# Patient Record
Sex: MALE | Race: WHITE | NOT HISPANIC OR LATINO | Employment: FULL TIME | URBAN - METROPOLITAN AREA
[De-identification: names, ages, dates, MRNs, and addresses within clinical notes are randomized per-mention and may not be internally consistent; named-entity substitution may affect disease eponyms.]

---
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
## 2019-03-05 ENCOUNTER — OFFICE VISIT (OUTPATIENT)
Dept: FAMILY MEDICINE CLINIC | Facility: CLINIC | Age: 19
End: 2019-03-05
Payer: COMMERCIAL

## 2019-03-05 VITALS
SYSTOLIC BLOOD PRESSURE: 110 MMHG | DIASTOLIC BLOOD PRESSURE: 70 MMHG | HEIGHT: 66 IN | TEMPERATURE: 98.5 F | WEIGHT: 154 LBS | OXYGEN SATURATION: 99 % | BODY MASS INDEX: 24.75 KG/M2 | HEART RATE: 92 BPM | RESPIRATION RATE: 14 BRPM

## 2019-03-05 DIAGNOSIS — F19.10 IV DRUG ABUSE (HCC): ICD-10-CM

## 2019-03-05 DIAGNOSIS — J02.9 SORE THROAT: Primary | ICD-10-CM

## 2019-03-05 PROCEDURE — 87880 STREP A ASSAY W/OPTIC: CPT | Performed by: NURSE PRACTITIONER

## 2019-03-05 PROCEDURE — 99213 OFFICE O/P EST LOW 20 MIN: CPT | Performed by: NURSE PRACTITIONER

## 2019-03-05 RX ORDER — AZITHROMYCIN 250 MG/1
TABLET, FILM COATED ORAL
Qty: 6 TABLET | Refills: 0 | Status: SHIPPED | OUTPATIENT
Start: 2019-03-05 | End: 2019-03-09

## 2019-03-05 NOTE — PATIENT INSTRUCTIONS

## 2019-03-05 NOTE — PROGRESS NOTES
Assessment/Plan:         Diagnoses and all orders for this visit:    Sore throat  -     Enoc Barr Virus Antibody Panel; Future  -     Mononucleosis screen; Future  -     Cancel: Throat culture; Future  -     POCT rapid strepA  -     Enoc Barr Virus Antibody Panel  -     Mononucleosis screen  -     Throat culture  -     azithromycin (ZITHROMAX) 250 mg tablet; Take 2 tablets today then 1 tablet daily x 4 days    Exposure to needle, initial encounter  -     Comprehensive metabolic panel; Future  -     CBC and differential; Future  -     HIV 1/2 Antigen/Antibody,Fourth Generation W/Rfl; Future  -     Hepatitis panel, acute; Future  -     Comprehensive metabolic panel  -     CBC and differential  -     HIV 1/2 Antigen/Antibody,Fourth Generation W/Rfl  -     Hepatitis panel, acute    Other orders  -     Cancel: LDL cholesterol, direct; Future  -     Cancel: Lipid panel; Future  -     Cancel: TSH, 3rd generation with Free T4 reflex; Future  -     Cancel: Urinalysis with reflex to microscopic; Future       Return in about 3 months (around 6/5/2019), or well adult visit  Subjective:   Chief Complaint   Patient presents with    Cough    Exposure to STD        Patient ID: Yomi Martin is a 22 y o  male presents today for a routine checkup  Yomi Martin is a 22 y o  male, who is here today for 2 reasons 1st is a cough which is documented below  The 2nd reason is here is that Maciel Fitch 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, reports which shoot anything and everything prior to that, is requesting HIV and hepatitis testing today  Cough   This is a new problem  Episode onset: over 10 days ago  The problem has been unchanged  The problem occurs hourly  The cough is non-productive  Associated symptoms include ear pain, myalgias, nasal congestion and a sore throat  Pertinent negatives include no chest pain, chills, fever, headaches, rhinorrhea, shortness of breath, weight loss or wheezing   The symptoms are aggravated by lying down  Risk factors for lung disease include smoking/tobacco exposure  He has tried OTC cough suppressant for the symptoms  The treatment provided no relief  His past medical history is significant for asthma and environmental allergies  The following portions of the patient's history were reviewed and updated as appropriate: allergies, current medications, past family history, past medical history, past social history, past surgical history and problem list   Patient Active Problem List   Diagnosis    Exercise-induced asthma    Allergic rhinitis     History reviewed  No pertinent past medical history  History reviewed  No pertinent surgical history    No Known Allergies  Family History   Problem Relation Age of Onset    Hypertension Father         Essential hypertension      Social History     Socioeconomic History    Marital status: Single     Spouse name: Not on file    Number of children: Not on file    Years of education: Not on file    Highest education level: Not on file   Occupational History    Not on file   Social Needs    Financial resource strain: Not on file    Food insecurity:     Worry: Not on file     Inability: Not on file    Transportation needs:     Medical: Not on file     Non-medical: Not on file   Tobacco Use    Smoking status: Current Every Day Smoker     Types: Cigarettes   Substance and Sexual Activity    Alcohol use: No    Drug use: Yes     Types: Marijuana    Sexual activity: Not on file   Lifestyle    Physical activity:     Days per week: Not on file     Minutes per session: Not on file    Stress: Not on file   Relationships    Social connections:     Talks on phone: Not on file     Gets together: Not on file     Attends Cheondoism service: Not on file     Active member of club or organization: Not on file     Attends meetings of clubs or organizations: Not on file     Relationship status: Not on file    Intimate partner violence:     Fear of current or ex partner: Not on file     Emotionally abused: Not on file     Physically abused: Not on file     Forced sexual activity: Not on file   Other Topics Concern    Not on file   Social History Narrative    Never a smoker - As per [employer]    Student      Current Outpatient Medications on File Prior to Visit   Medication Sig Dispense Refill    [DISCONTINUED] predniSONE 10 mg tablet Take 1 tablet (10 mg total) by mouth daily 5 tablets 11/5 & 11/6, 4 tablets 11/7 & 11/8, 3 tablets 11/9, 2 tablets 11/10, 1 tablet 11/11 25 tablet 0     No current facility-administered medications on file prior to visit  Review of Systems   Constitutional: Negative  Negative for chills, fever, unexpected weight change and weight loss  HENT: Positive for ear pain and sore throat  Negative for rhinorrhea  Eyes: Negative  Respiratory: Positive for cough  Negative for shortness of breath and wheezing  Cardiovascular: Negative  Negative for chest pain  Gastrointestinal: Negative  Endocrine: Negative  Genitourinary: Negative for discharge, dysuria, flank pain, genital sores, hematuria, penile pain, penile swelling and urgency  Musculoskeletal: Positive for myalgias  Skin: Negative  Allergic/Immunologic: Positive for environmental allergies  Neurological: Negative  Negative for headaches  Hematological: Negative  Psychiatric/Behavioral: Negative  Objective:  Vitals:    03/05/19 1601   BP: 110/70   Pulse: 92   Resp: 14   Temp: 98 5 °F (36 9 °C)   SpO2: 99%   Weight: 69 9 kg (154 lb)   Height: 5' 6" (1 676 m)     Body mass index is 24 86 kg/m²  Wt Readings from Last 3 Encounters:   03/05/19 69 9 kg (154 lb) (50 %, Z= 0 00)*   11/05/18 67 7 kg (149 lb 3 2 oz) (44 %, Z= -0 16)*   06/26/17 63 5 kg (140 lb) (38 %, Z= -0 31)*     * Growth percentiles are based on CDC (Boys, 2-20 Years) data       Temp Readings from Last 3 Encounters:   03/05/19 98 5 °F (36 9 °C)   11/05/18 99 °F (37 2 °C) (Tympanic)   06/26/17 (!) 100 °F (37 8 °C) (Tympanic)     BP Readings from Last 3 Encounters:   03/05/19 110/70   11/05/18 130/60   06/26/17 (!) 106/53 (18 %, Z = -0 90 /  10 %, Z = -1 26)*     *BP percentiles are based on the August 2017 AAP Clinical Practice Guideline for boys     Pulse Readings from Last 3 Encounters:   03/05/19 92   11/05/18 103   06/26/17 90        Physical Exam   Constitutional: He is oriented to person, place, and time  He appears well-developed and well-nourished  No distress  HENT:   Head: Normocephalic and atraumatic  Right Ear: External ear normal  Tympanic membrane is erythematous  Tympanic membrane is not perforated and not bulging  No middle ear effusion  No decreased hearing is noted  Left Ear: External ear normal  Tympanic membrane is erythematous  Tympanic membrane is not perforated and not bulging  No middle ear effusion  No decreased hearing is noted  Nose: Mucosal edema present  No sinus tenderness  Mouth/Throat: Uvula is midline  Posterior oropharyngeal erythema present  No oropharyngeal exudate  Tonsils are 2+ on the right  Tonsils are 2+ on the left  No tonsillar exudate  Eyes: Pupils are equal, round, and reactive to light  Conjunctivae are normal  Right eye exhibits no discharge  Left eye exhibits no discharge  Neck: Normal range of motion  Neck supple  No thyromegaly present  Cardiovascular: Normal rate, regular rhythm, normal heart sounds and intact distal pulses  No murmur heard  Pulmonary/Chest: Effort normal and breath sounds normal  No respiratory distress  He has no wheezes  Abdominal: Soft  Bowel sounds are normal  He exhibits no distension  There is no tenderness  Musculoskeletal: Normal range of motion  He exhibits no edema or tenderness  Lymphadenopathy:     He has cervical adenopathy  Neurological: He is alert and oriented to person, place, and time  Skin: Skin is warm and dry  He is not diaphoretic  No erythema  No pallor     Psychiatric: He has a normal mood and affect  His behavior is normal  Judgment and thought content normal    Nursing note and vitals reviewed      No Known Allergies  Patient Active Problem List   Diagnosis    Exercise-induced asthma    Allergic rhinitis       Current Outpatient Medications:     azithromycin (ZITHROMAX) 250 mg tablet, Take 2 tablets today then 1 tablet daily x 4 days, Disp: 6 tablet, Rfl: 0  Social History     Socioeconomic History    Marital status: Single     Spouse name: Not on file    Number of children: Not on file    Years of education: Not on file    Highest education level: Not on file   Occupational History    Not on file   Social Needs    Financial resource strain: Not on file    Food insecurity:     Worry: Not on file     Inability: Not on file    Transportation needs:     Medical: Not on file     Non-medical: Not on file   Tobacco Use    Smoking status: Current Every Day Smoker     Types: Cigarettes   Substance and Sexual Activity    Alcohol use: No    Drug use: Yes     Types: Marijuana    Sexual activity: Not on file   Lifestyle    Physical activity:     Days per week: Not on file     Minutes per session: Not on file    Stress: Not on file   Relationships    Social connections:     Talks on phone: Not on file     Gets together: Not on file     Attends Protestant service: Not on file     Active member of club or organization: Not on file     Attends meetings of clubs or organizations: Not on file     Relationship status: Not on file    Intimate partner violence:     Fear of current or ex partner: Not on file     Emotionally abused: Not on file     Physically abused: Not on file     Forced sexual activity: Not on file   Other Topics Concern    Not on file   Social History Narrative    Never a smoker - As per [employer]    Student      Family History   Problem Relation Age of Onset    Hypertension Father         Essential hypertension

## 2019-03-07 LAB
ALBUMIN SERPL-MCNC: 4.4 G/DL (ref 3.6–5.1)
ALBUMIN/GLOB SERPL: 2 (CALC) (ref 1–2.5)
ALP SERPL-CCNC: 103 U/L (ref 48–230)
ALT SERPL-CCNC: 16 U/L (ref 8–46)
AST SERPL-CCNC: 22 U/L (ref 12–32)
BASOPHILS # BLD AUTO: 69 CELLS/UL (ref 0–200)
BASOPHILS NFR BLD AUTO: 1.1 %
BILIRUB SERPL-MCNC: 0.4 MG/DL (ref 0.2–1.1)
BUN SERPL-MCNC: 18 MG/DL (ref 7–20)
BUN/CREAT SERPL: NORMAL (CALC) (ref 6–22)
CALCIUM SERPL-MCNC: 9.6 MG/DL (ref 8.9–10.4)
CHLORIDE SERPL-SCNC: 107 MMOL/L (ref 98–110)
CO2 SERPL-SCNC: 28 MMOL/L (ref 20–32)
CREAT SERPL-MCNC: 0.86 MG/DL (ref 0.6–1.26)
EBV NA IGG SER IA-ACNC: >600 U/ML
EBV VCA IGG SER IA-ACNC: 106 U/ML
EBV VCA IGM SER IA-ACNC: <36 U/ML
EOSINOPHIL # BLD AUTO: 158 CELLS/UL (ref 15–500)
EOSINOPHIL NFR BLD AUTO: 2.5 %
ERYTHROCYTE [DISTWIDTH] IN BLOOD BY AUTOMATED COUNT: 13.1 % (ref 11–15)
GLOBULIN SER CALC-MCNC: 2.2 G/DL (CALC) (ref 2.1–3.5)
GLUCOSE SERPL-MCNC: 99 MG/DL (ref 65–139)
HAV IGM SERPL QL IA: NORMAL
HBV CORE IGM SERPL QL IA: NORMAL
HBV SURFACE AG SERPL QL IA: NORMAL
HCT VFR BLD AUTO: 44.7 % (ref 38.5–50)
HCV AB S/CO SERPL IA: 0.01
HCV AB SERPL QL IA: NORMAL
HETEROPH AB SER QL LA: NEGATIVE
HGB BLD-MCNC: 14.8 G/DL (ref 13.2–17.1)
HIV 1+2 AB+HIV1 P24 AG SERPL QL IA: NORMAL
LYMPHOCYTES # BLD AUTO: 2186 CELLS/UL (ref 850–3900)
LYMPHOCYTES NFR BLD AUTO: 34.7 %
MCH RBC QN AUTO: 29.5 PG (ref 27–33)
MCHC RBC AUTO-ENTMCNC: 33.1 G/DL (ref 32–36)
MCV RBC AUTO: 89.2 FL (ref 80–100)
MONOCYTES # BLD AUTO: 617 CELLS/UL (ref 200–950)
MONOCYTES NFR BLD AUTO: 9.8 %
NEUTROPHILS # BLD AUTO: 3270 CELLS/UL (ref 1500–7800)
NEUTROPHILS NFR BLD AUTO: 51.9 %
PLATELET # BLD AUTO: 299 THOUSAND/UL (ref 140–400)
PMV BLD REES-ECKER: 9.2 FL (ref 7.5–12.5)
POTASSIUM SERPL-SCNC: 4.6 MMOL/L (ref 3.8–5.1)
PROT SERPL-MCNC: 6.6 G/DL (ref 6.3–8.2)
RBC # BLD AUTO: 5.01 MILLION/UL (ref 4.2–5.8)
SL AMB EGFR AFRICAN AMERICAN: 146 ML/MIN/1.73M2
SL AMB EGFR NON AFRICAN AMERICAN: 126 ML/MIN/1.73M2
SODIUM SERPL-SCNC: 142 MMOL/L (ref 135–146)
WBC # BLD AUTO: 6.3 THOUSAND/UL (ref 3.8–10.8)

## 2019-03-12 ENCOUNTER — OFFICE VISIT (OUTPATIENT)
Dept: FAMILY MEDICINE CLINIC | Facility: CLINIC | Age: 19
End: 2019-03-12
Payer: COMMERCIAL

## 2019-03-12 VITALS
BODY MASS INDEX: 23.63 KG/M2 | TEMPERATURE: 100.3 F | RESPIRATION RATE: 18 BRPM | HEIGHT: 66 IN | SYSTOLIC BLOOD PRESSURE: 100 MMHG | DIASTOLIC BLOOD PRESSURE: 74 MMHG | WEIGHT: 147 LBS | HEART RATE: 114 BPM

## 2019-03-12 DIAGNOSIS — J18.9 PNEUMONIA DUE TO INFECTIOUS ORGANISM, UNSPECIFIED LATERALITY, UNSPECIFIED PART OF LUNG: Primary | ICD-10-CM

## 2019-03-12 PROCEDURE — 3008F BODY MASS INDEX DOCD: CPT | Performed by: FAMILY MEDICINE

## 2019-03-12 PROCEDURE — 99213 OFFICE O/P EST LOW 20 MIN: CPT | Performed by: FAMILY MEDICINE

## 2019-03-12 RX ORDER — AMOXICILLIN AND CLAVULANATE POTASSIUM 875; 125 MG/1; MG/1
1 TABLET, FILM COATED ORAL EVERY 12 HOURS SCHEDULED
Qty: 20 TABLET | Refills: 0 | Status: SHIPPED | OUTPATIENT
Start: 2019-03-12 | End: 2019-03-22

## 2019-03-12 NOTE — PROGRESS NOTES
Assessment/Plan:     Diagnoses and all orders for this visit:    Pneumonia due to infectious organism, unspecified laterality, unspecified part of lung  -     amoxicillin-clavulanate (AUGMENTIN) 875-125 mg per tablet; Take 1 tablet by mouth every 12 (twelve) hours for 10 days  -     XR chest pa & lateral; Future      meds as above  Patient follow up if not improving over the next few days      Subjective:     Chief Complaint   Patient presents with    Cold Like Symptoms     x 1 month    Cough     x 1 month    Fever     past few days        Patient ID: Tory Onofre is a 22 y o  male  Patient here today for deep harsh cough as well as developed fever  He was seen a few weeks ago given a Z-Mumtaz for an upper respiratory infection he states he never really fully got better  States his overall symptoms started over 1 month ago with cough and congestion  Today he has a fever congestion and cough as well  He states his is he had a sore throat but that is resolved      The following portions of the patient's history were reviewed and updated as appropriate: allergies, current medications, past family history, past medical history, past social history, past surgical history and problem list     Review of Systems   Constitutional: Positive for chills, fatigue and fever  HENT: Positive for congestion, postnasal drip, rhinorrhea, sinus pressure and sinus pain  Eyes: Negative  Respiratory: Positive for cough  Cardiovascular: Negative  Gastrointestinal: Negative  Endocrine: Negative  Genitourinary: Negative  Musculoskeletal: Negative  Skin: Negative  Allergic/Immunologic: Negative  Neurological: Negative  Hematological: Negative  Psychiatric/Behavioral: Negative  All other systems reviewed and are negative          Objective:    Vitals:    03/12/19 1432   BP: 100/74   BP Location: Left arm   Patient Position: Sitting   Cuff Size: Standard   Pulse: (!) 114   Resp: 18   Temp: 100 3 °F (37 9 °C)   TempSrc: Tympanic   Weight: 66 7 kg (147 lb)   Height: 5' 6" (1 676 m)          Physical Exam   Constitutional: He is oriented to person, place, and time  He appears well-developed and well-nourished  No distress  HENT:   Head: Normocephalic  Right Ear: External ear normal    Left Ear: External ear normal    Nose: Nose normal    Mouth/Throat: Oropharynx is clear and moist    Eyes: Pupils are equal, round, and reactive to light  Conjunctivae and EOM are normal  Right eye exhibits no discharge  Left eye exhibits no discharge  Neck: Normal range of motion  Cardiovascular: Normal rate, regular rhythm and normal heart sounds  Pulmonary/Chest: Effort normal    Decreased breath sounds bilaterally   Abdominal: Soft  Bowel sounds are normal  He exhibits no distension  There is no tenderness  Musculoskeletal: Normal range of motion  Neurological: He is alert and oriented to person, place, and time  No cranial nerve deficit  Skin: Skin is warm and dry  No rash noted  Psychiatric: He has a normal mood and affect  His behavior is normal  Judgment and thought content normal    Nursing note and vitals reviewed

## 2019-04-09 ENCOUNTER — OFFICE VISIT (OUTPATIENT)
Dept: FAMILY MEDICINE CLINIC | Facility: CLINIC | Age: 19
End: 2019-04-09
Payer: COMMERCIAL

## 2019-04-09 VITALS
TEMPERATURE: 97.6 F | HEART RATE: 116 BPM | BODY MASS INDEX: 23.59 KG/M2 | RESPIRATION RATE: 18 BRPM | WEIGHT: 146.8 LBS | DIASTOLIC BLOOD PRESSURE: 78 MMHG | OXYGEN SATURATION: 99 % | HEIGHT: 66 IN | SYSTOLIC BLOOD PRESSURE: 110 MMHG

## 2019-04-09 DIAGNOSIS — L23.7 ALLERGIC CONTACT DERMATITIS DUE TO PLANTS, EXCEPT FOOD: Primary | ICD-10-CM

## 2019-04-09 PROCEDURE — 3008F BODY MASS INDEX DOCD: CPT | Performed by: NURSE PRACTITIONER

## 2019-04-09 PROCEDURE — 99213 OFFICE O/P EST LOW 20 MIN: CPT | Performed by: NURSE PRACTITIONER

## 2019-04-09 RX ORDER — METHYLPREDNISOLONE 4 MG/1
TABLET ORAL
Qty: 21 EACH | Refills: 0 | Status: SHIPPED | OUTPATIENT
Start: 2019-04-09 | End: 2020-01-10 | Stop reason: ALTCHOICE

## 2019-08-15 ENCOUNTER — TELEPHONE (OUTPATIENT)
Dept: FAMILY MEDICINE CLINIC | Facility: CLINIC | Age: 19
End: 2019-08-15

## 2020-01-10 ENCOUNTER — OFFICE VISIT (OUTPATIENT)
Dept: FAMILY MEDICINE CLINIC | Facility: CLINIC | Age: 20
End: 2020-01-10
Payer: COMMERCIAL

## 2020-01-10 VITALS
HEART RATE: 93 BPM | OXYGEN SATURATION: 96 % | DIASTOLIC BLOOD PRESSURE: 68 MMHG | SYSTOLIC BLOOD PRESSURE: 128 MMHG | HEIGHT: 66 IN | TEMPERATURE: 96.7 F | WEIGHT: 160 LBS | BODY MASS INDEX: 25.71 KG/M2

## 2020-01-10 DIAGNOSIS — Z11.3 ROUTINE SCREENING FOR STI (SEXUALLY TRANSMITTED INFECTION): ICD-10-CM

## 2020-01-10 DIAGNOSIS — R31.1 BENIGN ESSENTIAL MICROSCOPIC HEMATURIA: Primary | ICD-10-CM

## 2020-01-10 PROCEDURE — 99214 OFFICE O/P EST MOD 30 MIN: CPT | Performed by: FAMILY MEDICINE

## 2020-01-10 PROCEDURE — 3008F BODY MASS INDEX DOCD: CPT | Performed by: FAMILY MEDICINE

## 2020-01-10 NOTE — ASSESSMENT & PLAN NOTE
UA is negative in office  Advised this may have been due to trauma or injury and is now self resolved  Patient worried for serious concerns and so we will check u/s for completeness

## 2020-01-10 NOTE — PROGRESS NOTES
Tristin Chun 2000 male MRN: [id number]    Family Medicine Acute Visit    ASSESSMENT/PLAN  Problem List Items Addressed This Visit        Genitourinary    Benign essential microscopic hematuria - Primary     UA is negative in office  Advised this may have been due to trauma or injury and is now self resolved  Patient worried for serious concerns and so we will check u/s for completeness            Relevant Orders    US kidney and bladder    Chlamydia/GC amplified DNA by PCR    RPR    Human Immunodeficiency Virus 1/2 Antigen / Antibody ( Fourth Generation) with Reflex Testing    Hepatitis panel, acute    CBC and differential    Basic metabolic panel       Other    Routine screening for STI (sexually transmitted infection)    Relevant Orders    Chlamydia/GC amplified DNA by PCR    RPR    Human Immunodeficiency Virus 1/2 Antigen / Antibody ( Fourth Generation) with Reflex Testing    Hepatitis panel, acute    CBC and differential    Basic metabolic panel                No future appointments  SUBJECTIVE  CC: No chief complaint on file  HPI:  Tristin Chun is a 20 y o  male who presents for acute visit  While he was at work today states he had blood in his urine  States it feels irritated  There is no clots  No abdominal pain or back pain  No testicular pain or swelling  No new partner but interested in STI testing  Review of Systems   Constitutional: Negative for chills and fever  HENT: Negative for congestion, postnasal drip, rhinorrhea and sinus pain  Eyes: Negative for photophobia and visual disturbance  Respiratory: Negative for cough and shortness of breath  Cardiovascular: Negative for chest pain, palpitations and leg swelling  Gastrointestinal: Negative for abdominal pain, constipation, diarrhea, nausea and vomiting  Genitourinary: Positive for hematuria  Negative for difficulty urinating and dysuria  Musculoskeletal: Negative for arthralgias and myalgias  Skin: Negative for rash  Neurological: Negative for dizziness and syncope  Historical Information   The patient history was reviewed as follows:  History reviewed  No pertinent past medical history  History reviewed  No pertinent surgical history  Family History   Problem Relation Age of Onset    Hypertension Father         Essential hypertension       Social History   Social History     Substance and Sexual Activity   Alcohol Use Yes    Frequency: 2-3 times a week    Drinks per session: 3 or 4    Binge frequency: Never     Social History     Substance and Sexual Activity   Drug Use Yes    Types: Marijuana     Social History     Tobacco Use   Smoking Status Current Every Day Smoker    Packs/day: 0 25    Types: Cigarettes   Smokeless Tobacco Never Used       Medications:   No current outpatient medications on file  No Known Allergies    OBJECTIVE  Vitals:   Vitals:    01/10/20 1408   BP: 128/68   BP Location: Right arm   Patient Position: Sitting   Cuff Size: Large   Pulse: 93   Temp: (!) 96 7 °F (35 9 °C)   TempSrc: Tympanic   SpO2: 96%   Weight: 72 6 kg (160 lb)   Height: 5' 6" (1 676 m)         Physical Exam   Constitutional: He is oriented to person, place, and time  He appears well-developed and well-nourished  HENT:   Head: Normocephalic and atraumatic  Right Ear: External ear normal    Left Ear: External ear normal    Mouth/Throat: Oropharynx is clear and moist    Eyes: Pupils are equal, round, and reactive to light  Conjunctivae and EOM are normal    Neck: Normal range of motion  Neck supple  Cardiovascular: Normal rate, regular rhythm and normal heart sounds  No murmur heard  Pulmonary/Chest: Effort normal and breath sounds normal  No respiratory distress  He has no wheezes  Abdominal: Soft  Bowel sounds are normal  He exhibits no distension     Genitourinary:   Genitourinary Comments: Testicles are asymmetric (patient reports this is not new)  No lesions, no sores, no erythema or drainage Musculoskeletal: Normal range of motion  He exhibits no edema  Neurological: He is alert and oriented to person, place, and time  Skin: Skin is warm and dry  Psychiatric: He has a normal mood and affect   His behavior is normal                   Derick Quiroz DO    1/10/2020

## 2021-08-17 ENCOUNTER — OFFICE VISIT (OUTPATIENT)
Dept: URGENT CARE | Facility: CLINIC | Age: 21
End: 2021-08-17
Payer: COMMERCIAL

## 2021-08-17 VITALS
RESPIRATION RATE: 16 BRPM | OXYGEN SATURATION: 97 % | BODY MASS INDEX: 27.09 KG/M2 | HEART RATE: 99 BPM | WEIGHT: 162.6 LBS | DIASTOLIC BLOOD PRESSURE: 58 MMHG | SYSTOLIC BLOOD PRESSURE: 112 MMHG | HEIGHT: 65 IN | TEMPERATURE: 97.9 F

## 2021-08-17 DIAGNOSIS — L23.7 POISON IVY DERMATITIS: Primary | ICD-10-CM

## 2021-08-17 PROCEDURE — 99213 OFFICE O/P EST LOW 20 MIN: CPT | Performed by: PHYSICIAN ASSISTANT

## 2021-08-17 RX ORDER — PREDNISONE 10 MG/1
TABLET ORAL
Qty: 30 TABLET | Refills: 0 | Status: SHIPPED | OUTPATIENT
Start: 2021-08-17 | End: 2022-07-20 | Stop reason: ALTCHOICE

## 2021-08-17 NOTE — PROGRESS NOTES
[facility]        NAME: Nallely Enamorado is a 23 y o  male  : 2000    MRN: [id number]  DATE: 2021  TIME: 11:37 AM    /58   Pulse 99   Temp 97 9 °F (36 6 °C) (Tympanic)   Resp 16   Ht 5' 5 04" (1 652 m)   Wt 73 8 kg (162 lb 9 6 oz)   SpO2 97%   BMI 27 03 kg/m²     Assessment and Plan   Poison ivy dermatitis [L23 7]  1  Poison ivy dermatitis  predniSONE 10 mg tablet         Patient Instructions       Follow up with PCP in 3-5 days  Proceed to  ER if symptoms worsen  Chief Complaint     Chief Complaint   Patient presents with    Poison Ivy     Onset yesterday patient reports poison ivy in groin area         History of Present Illness       Pt with poison ivy rash to face neck abd and groin for several days     Poison Ivy        Review of Systems   Review of Systems   Constitutional: Negative  HENT: Negative  Eyes: Negative  Respiratory: Negative  Cardiovascular: Negative  Gastrointestinal: Negative  Endocrine: Negative  Genitourinary: Negative  Musculoskeletal: Negative  Skin: Positive for rash  Allergic/Immunologic: Negative  Neurological: Negative  Hematological: Negative  Psychiatric/Behavioral: Negative  All other systems reviewed and are negative  Current Medications       Current Outpatient Medications:     predniSONE 10 mg tablet, 5 tabs po qd x 2 days then 4 tabs po qd x 2 days then 3 tabs po qd x 2 days then 2 tabs po qd x 2 days then 1 tab po qd x 2 days, Disp: 30 tablet, Rfl: 0    Current Allergies     Allergies as of 2021    (No Known Allergies)            The following portions of the patient's history were reviewed and updated as appropriate: allergies, current medications, past family history, past medical history, past social history, past surgical history and problem list      History reviewed  No pertinent past medical history  History reviewed  No pertinent surgical history      Family History   Problem Relation Age of Onset    Hypertension Father         Essential hypertension          Medications have been verified  Objective   /58   Pulse 99   Temp 97 9 °F (36 6 °C) (Tympanic)   Resp 16   Ht 5' 5 04" (1 652 m)   Wt 73 8 kg (162 lb 9 6 oz)   SpO2 97%   BMI 27 03 kg/m²        Physical Exam     Physical Exam  Vitals and nursing note reviewed  Constitutional:       Appearance: Normal appearance  He is normal weight  HENT:      Head: Normocephalic and atraumatic  Right Ear: Tympanic membrane, ear canal and external ear normal       Left Ear: Tympanic membrane, ear canal and external ear normal       Nose: Nose normal       Mouth/Throat:      Mouth: Mucous membranes are moist       Pharynx: Oropharynx is clear  Eyes:      Extraocular Movements: Extraocular movements intact  Conjunctiva/sclera: Conjunctivae normal       Pupils: Pupils are equal, round, and reactive to light  Cardiovascular:      Rate and Rhythm: Normal rate and regular rhythm  Pulses: Normal pulses  Heart sounds: Normal heart sounds  Pulmonary:      Effort: Pulmonary effort is normal       Breath sounds: Normal breath sounds  Abdominal:      General: Abdomen is flat  Bowel sounds are normal       Palpations: Abdomen is soft  Musculoskeletal:         General: Normal range of motion  Cervical back: Normal range of motion and neck supple  Skin:     General: Skin is warm  Capillary Refill: Capillary refill takes less than 2 seconds  Comments: Poison ivy dermattitis to neck torso arms    Neurological:      General: No focal deficit present  Mental Status: He is alert and oriented to person, place, and time     Psychiatric:         Mood and Affect: Mood normal          Behavior: Behavior normal

## 2021-08-17 NOTE — PATIENT INSTRUCTIONS
Poison Ivy   WHAT YOU NEED TO KNOW:   Poison ivy is a plant that can cause an itchy, uncomfortable rash on your skin  Poison ivy grows as a shrub or vine in woods, fields, and areas of thick [place]  It has 3 bright green leaves on each stem that turn red in [place]  DISCHARGE INSTRUCTIONS:   Medicines:   · Antiseptic or drying creams or ointments: These medicines may be used to dry out the rash and decrease the itching  These products may be available without a doctor's order  · Steroids: This medicine helps decrease itching and inflammation  It can be given as a cream to apply to your skin or as a pill  · Antihistamines: This medicine may help decrease itching and help you sleep  It is available without a doctor's order  · Take your medicine as directed  Contact your healthcare provider if you think your medicine is not helping or if you have side effects  Tell him of her if you are allergic to any medicine  Keep a list of the medicines, vitamins, and herbs you take  Include the amounts, and when and why you take them  Bring the list or the pill bottles to follow-up visits  Carry your medicine list with you in case of an emergency  Follow up with your healthcare provider as directed:  Write down your questions so you remember to ask them during your visits  How your poison ivy rash spreads: You cannot spread poison ivy by touching your rash or the liquid from your blisters  Poison ivy is spread only if you scratch your skin while it still has oil on it  You may think your rash is spreading because new rashes appear over a number of days  This happens because areas covered by thin skin break out in a rash first  Your face or forearms may develop a rash before thicker areas, such as the palms of your hands  Self-care:   · Keep your rash clean and dry:  Wash it with soap and water  Gently pat it dry with a clean towel  · Try not to scratch or rub your rash:   This can cause your skin to become infected  · Use a compress on your rash:  Dip a clean washcloth in cool water  Wring it out and place it on your rash  Leave the washcloth on your skin for 15 minutes  Do this at least 3 times per day  · Take a cornstarch or oatmeal bath: If your rash is too large to cover with wet washcloths, take 3 or 4 cornstarch baths daily  Mix 1 pound of cornstarch with a little water to make a paste  Add the paste to a tub full of water and mix well  You may also use colloidal oatmeal in the bath water  Use lukewarm water  Avoid hot water because it may cause your itching to increase  Prevent a poison ivy rash in the future:   · Wear skin protection:  Wear long pants, a long-sleeved shirt, and gloves  Use a skin block lotion to protect your skin from poison ivy oil  You can find this at a drugstore without a prescription  · Wash clothing after possible exposure: If you think you have been near a poison ivy plant, wash the clothes you were wearing separately from other clothes  Rinse the washing machine well after you take the clothes out  Scrub boots and shoes with warm, soapy water  Dry clean items and clothing that you cannot wash in water  Poison ivy oil is sticky and can stay on surfaces for a long time  It can cause a new rash even years later  · Bathe your pet:  Use warm water and shampoo on your pet's fur  This will prevent the spread of oil to your skin, car, and home  Wear long sleeves, long pants, and gloves while washing pets or any items that may have oil on them  · Reduce exposure to poison ivy:  Do not touch plants that look like poison ivy  Keep your yard free of poison ivy  While protecting your skin, remove the plant and the roots  Place them in a plastic bag and seal the bag tightly  · Do not burn poison ivy plants: This can spread the oil through the air  If you breathe the oil into your lungs, you could have swelling and serious breathing problems   Oil that clings to the fire carlos can land on your skin and cause a rash  Contact your healthcare provider if:   · You have pus, soft yellow scabs, or tenderness on the rash  · The itching gets worse or keeps you awake at night  · The rash covers more than 1/4 of your skin or spreads to your eyes, mouth, or genital area  · The rash is not better after 2 to 3 weeks  · You have tender, swollen glands on the sides of your neck  · You have swelling in your arms and legs  · You have questions or concerns about your condition or care  Return to the emergency department if:   · You have a fever  · You have redness, swelling, and tenderness around the rash  · You have trouble breathing  © Copyright [employer] 2021 Information is for End User's use only and may not be sold, redistributed or otherwise used for commercial purposes  All illustrations and images included in CareNotes® are the copyrighted property of A D A M , Inc  or [facility] [place] Anitra Leo   The above information is an  only  It is not intended as medical advice for individual conditions or treatments  Talk to your doctor, nurse or pharmacist before following any medical regimen to see if it is safe and effective for you

## 2021-09-29 ENCOUNTER — TELEPHONE (OUTPATIENT)
Dept: FAMILY MEDICINE CLINIC | Facility: CLINIC | Age: 21
End: 2021-09-29

## 2021-09-29 DIAGNOSIS — Z20.2 EXPOSURE TO STD: Primary | ICD-10-CM

## 2021-09-29 NOTE — TELEPHONE ENCOUNTER
Lian Barrett has been exposed to Chlamydia and was wondering if you can send an order for a test due to no appointments available and I did offer Urgent care but he said it costs too much    Please advise  Thank you

## 2021-09-29 NOTE — TELEPHONE ENCOUNTER
I ordered a chlamydia/gonorrhea test for him is done through the urine  I believe he goes to [facility]   He can come and  the script and go to [facility] when he is able to

## 2021-09-29 NOTE — TELEPHONE ENCOUNTER
Unable to reach patient, voicemail box is full  Called patient's mother requesting that she have him give us a call  Patient needs to  his chlamydia testing lab work

## 2021-09-29 NOTE — TELEPHONE ENCOUNTER
Patient returned [facility] call  I advised him the labs were ordered and told him I will fax over

## 2022-07-20 ENCOUNTER — OFFICE VISIT (OUTPATIENT)
Dept: FAMILY MEDICINE CLINIC | Facility: CLINIC | Age: 22
End: 2022-07-20
Payer: COMMERCIAL

## 2022-07-20 VITALS
RESPIRATION RATE: 17 BRPM | SYSTOLIC BLOOD PRESSURE: 134 MMHG | HEART RATE: 103 BPM | WEIGHT: 178 LBS | HEIGHT: 65 IN | OXYGEN SATURATION: 97 % | TEMPERATURE: 98.8 F | DIASTOLIC BLOOD PRESSURE: 76 MMHG | BODY MASS INDEX: 29.66 KG/M2

## 2022-07-20 DIAGNOSIS — L23.7 POISON IVY: Primary | ICD-10-CM

## 2022-07-20 PROCEDURE — 3725F SCREEN DEPRESSION PERFORMED: CPT | Performed by: NURSE PRACTITIONER

## 2022-07-20 PROCEDURE — 99213 OFFICE O/P EST LOW 20 MIN: CPT | Performed by: NURSE PRACTITIONER

## 2022-07-20 RX ORDER — PREDNISONE 10 MG/1
10 TABLET ORAL DAILY
Qty: 15 TABLET | Refills: 0 | Status: SHIPPED | OUTPATIENT
Start: 2022-07-20

## 2022-07-20 NOTE — PROGRESS NOTES
Assessment/Plan:    No problem-specific Assessment & Plan notes found for this encounter  Problem List Items Addressed This Visit    None     Visit Diagnoses     Poison ivy    -  Primary    Relevant Medications    predniSONE 10 mg tablet    triamcinolone (KENALOG) 0 1 % ointment            Subjective:      Patient ID: Fernando Wilson is a 24 y o  male  Patient here today for poison ivy rash to both legs  Poison Radha Gates  This is a new problem  The current episode started 1 to 4 weeks ago (2 weeks ago )  The problem has been gradually improving since onset  The affected locations include the left lower leg, right lower leg, right ankle and left ankle  The rash is characterized by itchiness and draining  He was exposed to plant contact  Pertinent negatives include no congestion, cough, diarrhea, eye pain, fatigue, fever, rhinorrhea, shortness of breath, sore throat or vomiting  Treatments tried: tried gasoline, paint thinner, and mattie dish soap  The treatment provided mild relief  The following portions of the patient's history were reviewed and updated as appropriate: allergies, current medications, past family history, past medical history, past social history, past surgical history and problem list     Review of Systems   Constitutional: Negative  Negative for chills, fatigue and fever  HENT: Negative  Negative for congestion, ear discharge, ear pain, rhinorrhea, sinus pressure, sinus pain and sore throat  Eyes: Negative  Negative for pain, discharge and visual disturbance  Respiratory: Negative  Negative for cough, chest tightness, shortness of breath and wheezing  Cardiovascular: Negative  Negative for chest pain, palpitations and leg swelling  Gastrointestinal: Negative  Negative for abdominal pain, constipation, diarrhea, nausea and vomiting  Endocrine: Negative  Negative for polydipsia and polyuria  Genitourinary: Negative    Negative for difficulty urinating, dysuria and hematuria  Musculoskeletal: Negative  Negative for arthralgias, back pain and myalgias  Skin: Positive for rash  Negative for wound  Allergic/Immunologic: Negative  Negative for environmental allergies  Neurological: Negative  Negative for dizziness, seizures, syncope, light-headedness and headaches  Hematological: Negative  Does not bruise/bleed easily  Psychiatric/Behavioral: Negative  Negative for dysphoric mood  The patient is not nervous/anxious  All other systems reviewed and are negative  Objective:      /76 (BP Location: Left arm, Patient Position: Sitting, Cuff Size: Large)   Pulse 103   Temp 98 8 °F (37 1 °C) (Tympanic)   Resp 17   Ht 5' 5 04" (1 652 m)   Wt 80 7 kg (178 lb)   SpO2 97%   BMI 29 58 kg/m²          Physical Exam  Vitals and nursing note reviewed  Constitutional:       General: He is not in acute distress  Appearance: Normal appearance  He is not ill-appearing, toxic-appearing or diaphoretic  HENT:      Head: Normocephalic and atraumatic  Right Ear: Tympanic membrane, ear canal and external ear normal  There is no impacted cerumen  Left Ear: Tympanic membrane, ear canal and external ear normal  There is no impacted cerumen  Nose: Nose normal  No congestion or rhinorrhea  Mouth/Throat:      Mouth: Mucous membranes are moist       Pharynx: Oropharynx is clear  No oropharyngeal exudate or posterior oropharyngeal erythema  Eyes:      Extraocular Movements: Extraocular movements intact  Conjunctiva/sclera: Conjunctivae normal    Cardiovascular:      Rate and Rhythm: Normal rate and regular rhythm  Pulses: Normal pulses  Heart sounds: Normal heart sounds  No murmur heard  Pulmonary:      Effort: Pulmonary effort is normal  No respiratory distress  Breath sounds: Normal breath sounds  No wheezing  Abdominal:      General: Bowel sounds are normal  There is no distension  Palpations: Abdomen is soft  Tenderness: There is no abdominal tenderness  Musculoskeletal:         General: Swelling (trace pitting edema in left lower extremity shin and ankle area ) present  Normal range of motion  Cervical back: Normal range of motion and neck supple  Right lower leg: No edema  Left lower leg: Edema present  Lymphadenopathy:      Cervical: No cervical adenopathy  Skin:     General: Skin is warm and dry  Capillary Refill: Capillary refill takes less than 2 seconds  Findings: Rash (left shin with large excoriated area, right shin with red papular rash  ) present  Neurological:      General: No focal deficit present  Mental Status: He is alert and oriented to person, place, and time  Psychiatric:         Mood and Affect: Mood normal          Behavior: Behavior normal          Thought Content:  Thought content normal          Judgment: Judgment normal

## 2022-07-20 NOTE — PATIENT INSTRUCTIONS
Poison Ivy   WHAT YOU NEED TO KNOW:   Poison ivy is a plant that can cause an itchy, uncomfortable rash on your skin  Poison ivy grows as a shrub or vine in woods, fields, and areas of thick [place]  It has 3 bright green leaves on each stem that turn red in [place]  DISCHARGE INSTRUCTIONS:   Medicines:   Antiseptic or drying creams or ointments: These medicines may be used to dry out the rash and decrease the itching  These products may be available without a doctor's order  Steroids: This medicine helps decrease itching and inflammation  It can be given as a cream to apply to your skin or as a pill  Antihistamines: This medicine may help decrease itching and help you sleep  It is available without a doctor's order  Take your medicine as directed  Contact your healthcare provider if you think your medicine is not helping or if you have side effects  Tell him of her if you are allergic to any medicine  Keep a list of the medicines, vitamins, and herbs you take  Include the amounts, and when and why you take them  Bring the list or the pill bottles to follow-up visits  Carry your medicine list with you in case of an emergency  Follow up with your doctor as directed:  Write down your questions so you remember to ask them during your visits  How your poison ivy rash spreads: You cannot spread poison ivy by touching your rash or the liquid from your blisters  Poison ivy is spread only if you scratch your skin while it still has oil on it  You may think your rash is spreading because new rashes appear over a number of days  This happens because areas covered by thin skin break out in a rash first  Your face or forearms may develop a rash before thicker areas, such as the palms of your hands  Self-care:   Keep your rash clean and dry:  Wash it with soap and water  Gently pat it dry with a clean towel  Try not to scratch or rub your rash: This can cause your skin to become infected      Use a compress on your rash:  Dip a clean washcloth in cool water  Wring it out and place it on your rash  Leave the washcloth on your skin for 15 minutes  Do this at least 3 times per day  Take a cornstarch or oatmeal bath: If your rash is too large to cover with wet washcloths, take 3 or 4 cornstarch baths daily  Mix 1 pound of cornstarch with a little water to make a paste  Add the paste to a tub full of water and mix well  You may also use colloidal oatmeal in the bath water  Use lukewarm water  Avoid hot water because it may cause your itching to increase  Prevent a poison ivy rash in the future:   Wear skin protection:  Wear long pants, a long-sleeved shirt, and gloves  Use a skin block lotion to protect your skin from poison ivy oil  You can find this at a drugstore without a prescription  Wash clothing after possible exposure: If you think you have been near a poison ivy plant, wash the clothes you were wearing separately from other clothes  Rinse the washing machine well after you take the clothes out  Scrub boots and shoes with warm, soapy water  Dry clean items and clothing that you cannot wash in water  Poison ivy oil is sticky and can stay on surfaces for a long time  It can cause a new rash even years later  Bathe your pet:  Use warm water and shampoo on your pet's fur  This will prevent the spread of oil to your skin, car, and home  Wear long sleeves, long pants, and gloves while washing pets or any items that may have oil on them  Reduce exposure to poison ivy:  Do not touch plants that look like poison ivy  Keep your yard free of poison ivy  While protecting your skin, remove the plant and the roots  Place them in a plastic bag and seal the bag tightly  Do not burn poison ivy plants: This can spread the oil through the air  If you breathe the oil into your lungs, you could have swelling and serious breathing problems   Oil that clings to the fire carlos can land on your skin and cause a rash  Contact your healthcare provider if:   You have pus, soft yellow scabs, or tenderness on the rash  The itching gets worse or keeps you awake at night  The rash covers more than 1/4 of your skin or spreads to your eyes, mouth, or genital area  The rash is not better after 2 to 3 weeks  You have tender, swollen glands on the sides of your neck  You have swelling in your arms and legs  You have questions or concerns about your condition or care  Return to the emergency department if:   You have a fever  You have redness, swelling, and tenderness around the rash  You have trouble breathing  © Copyright [employer] 2022 Information is for End User's use only and may not be sold, redistributed or otherwise used for commercial purposes  All illustrations and images included in CareNotes® are the copyrighted property of [employer]  or Jeana Johnson  The above information is an  only  It is not intended as medical advice for individual conditions or treatments  Talk to your doctor, nurse or pharmacist before following any medical regimen to see if it is safe and effective for you

## 2023-06-26 ENCOUNTER — OFFICE VISIT (OUTPATIENT)
Dept: FAMILY MEDICINE CLINIC | Facility: CLINIC | Age: 23
End: 2023-06-26
Payer: COMMERCIAL

## 2023-06-26 VITALS
HEART RATE: 116 BPM | HEIGHT: 65 IN | RESPIRATION RATE: 17 BRPM | TEMPERATURE: 98.8 F | BODY MASS INDEX: 30.86 KG/M2 | WEIGHT: 185.2 LBS | OXYGEN SATURATION: 98 % | SYSTOLIC BLOOD PRESSURE: 124 MMHG | DIASTOLIC BLOOD PRESSURE: 66 MMHG

## 2023-06-26 DIAGNOSIS — B08.4 HAND, FOOT AND MOUTH DISEASE (HFMD): Primary | ICD-10-CM

## 2023-06-26 PROBLEM — Z11.3 ROUTINE SCREENING FOR STI (SEXUALLY TRANSMITTED INFECTION): Status: RESOLVED | Noted: 2020-01-10 | Resolved: 2023-06-26

## 2023-06-26 PROCEDURE — 99213 OFFICE O/P EST LOW 20 MIN: CPT | Performed by: FAMILY MEDICINE

## 2023-06-26 NOTE — ASSESSMENT & PLAN NOTE
The patient's symptoms are consistent with hand-foot-and-mouth disease likely caused by the coxsackievirus a6 due to his facial involvement  Fortunately, the patient's symptoms are improving  I advised the patient that this is self-limiting and it will continue to run its course  I would expect it will continue to improve as the week proceeds  He will continue with his hydration and will continue to rest   He will call with any worsening symptoms or fever  We will see him back in the office as scheduled  He can take Tylenol as needed for fever or achiness  We will see him back as needed

## 2023-06-26 NOTE — PROGRESS NOTES
Assessment/Plan:  Problem List Items Addressed This Visit        Digestive    Hand, foot and mouth disease (HFMD) - Primary     The patient's symptoms are consistent with hand-foot-and-mouth disease likely caused by the coxsackievirus a6 due to his facial involvement  Fortunately, the patient's symptoms are improving  I advised the patient that this is self-limiting and it will continue to run its course  I would expect it will continue to improve as the week proceeds  He will continue with his hydration and will continue to rest   He will call with any worsening symptoms or fever  We will see him back in the office as scheduled  He can take Tylenol as needed for fever or achiness  We will see him back as needed  Return for Annual physical    I spent 15 minutes during the visit reviewing the history from the patient, performing the examination, discussing the findings with the patient, providing counseling and education, and making a plan  I spent 15 minutes ordering referrals and testing and documenting  Subjective:   Chief Complaint   Patient presents with   • skin issue     Pts son's day care class room has hand foot and mouth  Pt noted bumps on himself on Friday on face, hands and feet  Patient ID: Jose Manuel Worthington is a 20 y o  male presents today for evaluation of bumps on his face, hands, and feet  Jose Manuel Worthington is a 20 y o  male who presents today for evaluation of bumps that he has on his face, hands, and feet  He had fevers and body aches that started on June 21, 2023  He had a low-grade fever that day  He then started with bumps on his hands and feet on June 23, 2023  It is worst on his feet  He states the rash is a little bit better today  It had spread to his face and his arms as well  There is no pain but itchiness at times  There was itchy , but is better  He was wondering it is heat related  His son has it as well      There was a sore throat at onset and some runny nose and congestion  The patient denies any chest pain, shortness of breath, or palpitations  There is no edema  There are no headaches or visual changes  There is no lightheadedness, dizziness, or fainting spells  There is no cough  His bumps are better  He states that his son's  class recently had an outbreak of hand-foot-and-mouth disease  The following portions of the patient's history were reviewed and updated as appropriate: allergies, current medications, past family history, past medical history, past social history, past surgical history and problem list   Patient Active Problem List   Diagnosis   • Exercise-induced asthma   • Allergic rhinitis   • Benign essential microscopic hematuria   • Hand, foot and mouth disease (HFMD)     History reviewed  No pertinent past medical history  History reviewed  No pertinent surgical history  No Known Allergies  Family History   Problem Relation Age of Onset   • Hypertension Father         Essential hypertension      Social History     Socioeconomic History   • Marital status: Registered Domestic Partner     Spouse name: Not on file   • Number of children: Not on file   • Years of education: Not on file   • Highest education level: Not on file   Occupational History   • Not on file   Tobacco Use   • Smoking status: Former     Packs/day: 0 25     Types: Cigarettes   • Smokeless tobacco: Never   Vaping Use   • Vaping Use: Never used   Substance and Sexual Activity   • Alcohol use:  Yes   • Drug use: Yes     Types: Marijuana   • Sexual activity: Not Currently   Other Topics Concern   • Not on file   Social History Narrative    Never a smoker - As per [facility]    Student      Social Determinants of Health     Financial Resource Strain: Low Risk  (1/10/2020)    Overall Financial Resource Strain (CARDIA)    • Difficulty of Paying Living Expenses: Not hard at all   Food Insecurity: No Food Insecurity (1/10/2020)    Hunger Vital Sign    • Worried About Running Out of Food in the Last Year: Never true    • Ran Out of Food in the Last Year: Never true   Transportation Needs: No Transportation Needs (1/10/2020)    PRAPARE - Transportation    • Lack of Transportation (Medical): No    • Lack of Transportation (Non-Medical): No   Physical Activity: Inactive (1/10/2020)    Exercise Vital Sign    • Days of Exercise per Week: 0 days    • Minutes of Exercise per Session: 0 min   Stress: Stress Concern Present (1/10/2020)    [address]    • Feeling of Stress : To some extent   Social Connections: Somewhat Isolated (1/10/2020)    Social Connection and Isolation Panel [NHANES]    • Frequency of Communication with Friends and Family: More than three times a week    • Frequency of Social Gatherings with Friends and Family: Three times a week    • Attends Hinduism Services: Never    • Active Member of Clubs or Organizations: No    • Attends Club or Organization Meetings: Never    • Marital Status: Living with partner   Intimate Partner Violence: Not At Risk (7/20/2022)    Humiliation, Afraid, Rape, and Kick questionnaire    • Fear of Current or Ex-Partner: No    • Emotionally Abused: No    • Physically Abused: No    • Sexually Abused: No   Housing Stability: Not on file     Current Outpatient Medications on File Prior to Visit   Medication Sig Dispense Refill   • [DISCONTINUED] predniSONE 10 mg tablet Take 1 tablet (10 mg total) by mouth daily Take 5 tabs on day 1 (50 mg), 4 tabs on day 2 (40 mg), 3 tabs on day 3 (30 mg) 2 tabs on day 4 (20 mg), 1 tab on day 5 (10 mg) (Patient not taking: Reported on 6/26/2023) 15 tablet 0   • [DISCONTINUED] triamcinolone (KENALOG) 0 1 % ointment Apply topically 2 (two) times a day (Patient not taking: Reported on 6/26/2023) 30 g 1     No current facility-administered medications on file prior to visit  Review of Systems   Constitutional: Negative  HENT: Positive for sore throat  "  Eyes: Negative  Respiratory: Negative  Cardiovascular: Negative  Gastrointestinal: Negative  Endocrine: Negative  Genitourinary: Negative  Musculoskeletal: Negative  Skin: Positive for rash  Allergic/Immunologic: Negative  Neurological: Negative  Hematological: Negative  Psychiatric/Behavioral: Negative  Objective:  Vitals:    06/26/23 1034   BP: 124/66   BP Location: Left arm   Patient Position: Sitting   Cuff Size: Large   Pulse: (!) 116   Resp: 17   Temp: 98 8 °F (37 1 °C)   TempSrc: Tympanic   SpO2: 98%   Weight: 84 kg (185 lb 3 2 oz)   Height: 5' 5 04\" (1 652 m)     Body mass index is 30 78 kg/m²  Physical Exam  Vitals and nursing note reviewed  Constitutional:       General: He is not in acute distress  Appearance: He is well-developed  He is not diaphoretic  Eyes:      Pupils: Pupils are equal, round, and reactive to light  Neck:      Thyroid: No thyromegaly  Vascular: No JVD  Trachea: No tracheal deviation  Cardiovascular:      Rate and Rhythm: Normal rate and regular rhythm  Heart sounds: Normal heart sounds  No murmur heard  No friction rub  No gallop  Pulmonary:      Effort: Pulmonary effort is normal  No respiratory distress  Breath sounds: Normal breath sounds  No stridor  No wheezing or rales  Chest:      Chest wall: No tenderness  Abdominal:      General: Bowel sounds are normal  There is no distension  Palpations: Abdomen is soft  There is no mass  Tenderness: There is no abdominal tenderness  There is no guarding or rebound  Musculoskeletal:         General: Normal range of motion  Cervical back: Normal range of motion and neck supple  Lymphadenopathy:      Cervical: No cervical adenopathy  Skin:     General: Skin is warm and dry  Coloration: Skin is not pale  Findings: Erythema and rash present  Comments:  There is a vesicular rash with a red base on the patient's hands on the " palmar surface bilaterally  There is also evidence of a vesicular type rash with a red base in the patient's feet on his plantar surface  He also has scattered macular papular lesions on his face and forearms  Neurological:      Mental Status: He is alert and oriented to person, place, and time  Cranial Nerves: No cranial nerve deficit  Motor: No abnormal muscle tone  Coordination: Coordination normal       Deep Tendon Reflexes: Reflexes are normal and symmetric  Reflexes normal            BMI Counseling: Body mass index is 30 78 kg/m²  The BMI is above normal  Nutrition recommendations include decreasing portion sizes, encouraging healthy choices of fruits and vegetables, decreasing fast food intake, consuming healthier snacks, limiting drinks that contain sugar, moderation in carbohydrate intake, increasing intake of lean protein, reducing intake of saturated and trans fat and reducing intake of cholesterol  Exercise recommendations include exercising 3-5 times per week and strength training exercises  No pharmacotherapy was ordered  Patient referred to PCP  Rationale for BMI follow-up plan is due to patient being overweight or obese  Depression Screening and Follow-up Plan: Patient was screened for depression during today's encounter  They screened negative with a PHQ-2 score of 0

## 2023-07-18 ENCOUNTER — OFFICE VISIT (OUTPATIENT)
Dept: FAMILY MEDICINE CLINIC | Facility: CLINIC | Age: 23
End: 2023-07-18
Payer: COMMERCIAL

## 2023-07-18 VITALS
RESPIRATION RATE: 14 BRPM | HEART RATE: 94 BPM | BODY MASS INDEX: 31.12 KG/M2 | WEIGHT: 186.8 LBS | HEIGHT: 65 IN | DIASTOLIC BLOOD PRESSURE: 86 MMHG | OXYGEN SATURATION: 96 % | SYSTOLIC BLOOD PRESSURE: 122 MMHG | TEMPERATURE: 97.9 F

## 2023-07-18 DIAGNOSIS — L23.7 POISON IVY DERMATITIS: Primary | ICD-10-CM

## 2023-07-18 PROCEDURE — 99213 OFFICE O/P EST LOW 20 MIN: CPT | Performed by: FAMILY MEDICINE

## 2023-07-18 RX ORDER — PREDNISONE 20 MG/1
TABLET ORAL
Qty: 15 TABLET | Refills: 0 | Status: SHIPPED | OUTPATIENT
Start: 2023-07-18

## 2023-07-18 NOTE — PROGRESS NOTES
Assessment/Plan:  Problem List Items Addressed This Visit    None  Visit Diagnoses     Poison ivy dermatitis    -  Primary    Relevant Medications    predniSONE 20 mg tablet      The patient has the start of poison ivy dermatitis on his anterior abdomen, left arm, left leg, and his neck. He has a history of severe reactions to poison ivy. We will treat him with a prednisone taper. He can try Aveeno anti-itch lotion to help with the itch and cool compresses. He will call with any worsening symptoms. We will see him back as scheduled. Return if symptoms worsen or fail to improve. I spent [] minutes during the visit reviewing the history from the patient, performing the examination, discussing the findings with the patient, providing counseling and education, and making a plan. I spent [] minutes ordering referrals and testing and documenting. Subjective:   Chief Complaint   Patient presents with   • Poison Ivy        Patient ID: Lazara Kitchen is a 20 y.o. male presents today for for evaluation of poison liya. Lazara Kitchen is a 20 y.o. male who presents today with the start of an outbreak of poison ivy dermatitis. The patient has a longstanding history of allergic reaction to poison ivy. 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 He started with a rash in his left arm and left leg. It is now also on his anterior abdomen and started on his neck. He has tried some over-the-counter topical ointments with little relief. The areas are very itchy and he is concerned it is going to get worse. He denies any shortness of breath or chest tightness.         The following portions of the patient's history were reviewed and updated as appropriate: allergies, current medications, past family history, past medical history, past social history, past surgical history and problem list.  Patient Active Problem List   Diagnosis   • Exercise-induced asthma   • Allergic rhinitis   • Benign essential microscopic hematuria   • Hand, foot and mouth disease (HFMD)     History reviewed. No pertinent past medical history. History reviewed. No pertinent surgical history. No Known Allergies  Family History   Problem Relation Age of Onset   • Hypertension Father         Essential hypertension      Social History     Socioeconomic History   • Marital status: Registered Domestic Partner     Spouse name: Not on file   • Number of children: Not on file   • Years of education: Not on file   • Highest education level: Not on file   Occupational History   • Not on file   Tobacco Use   • Smoking status: Former     Packs/day: 0.25     Types: Cigarettes     Passive exposure: Past   • Smokeless tobacco: Never   Vaping Use   • Vaping Use: Never used   Substance and Sexual Activity   • Alcohol use: Yes   • Drug use: Yes     Types: Marijuana   • Sexual activity: Not Currently   Other Topics Concern   • Not on file   Social History Narrative    Never a smoker - As per [facility]    Student      Social Determinants of Health     Financial Resource Strain: Low Risk  (1/10/2020)    Overall Financial Resource Strain (CARDIA)    • Difficulty of Paying Living Expenses: Not hard at all   Food Insecurity: No Food Insecurity (1/10/2020)    Hunger Vital Sign    • Worried About Running Out of Food in the Last Year: Never true    • Ran Out of Food in the Last Year: Never true   Transportation Needs: No Transportation Needs (1/10/2020)    PRAPARE - Transportation    • Lack of Transportation (Medical): No    • Lack of Transportation (Non-Medical): No   Physical Activity: Inactive (1/10/2020)    Exercise Vital Sign    • Days of Exercise per Week: 0 days    • Minutes of Exercise per Session: 0 min   Stress: Stress Concern Present (1/10/2020)    [address] [facility]    • Feeling of Stress :  To some extent   Social Connections: Somewhat Isolated (1/10/2020)    Social Connection and Isolation Panel [NHANES]    • Frequency of Communication with Friends and Family: More than three times a week    • Frequency of Social Gatherings with Friends and Family: Three times a week    • Attends Holiness Services: Never    • Active Member of Clubs or Organizations: No    • Attends Club or Organization Meetings: Never    • Marital Status: Living with partner   Intimate Partner Violence: Not At Risk (7/18/2023)    Humiliation, Afraid, Rape, and Kick questionnaire    • Fear of Current or Ex-Partner: No    • Emotionally Abused: No    • Physically Abused: No    • Sexually Abused: No   Housing Stability: Not on file     No current outpatient medications on file prior to visit. No current facility-administered medications on file prior to visit. Review of Systems   Constitutional: Negative. HENT: Negative. Eyes: Negative. Respiratory: Negative. Cardiovascular: Negative. Gastrointestinal: Negative. Endocrine: Negative. Genitourinary: Negative. Musculoskeletal: Negative. Skin: Positive for rash. Allergic/Immunologic: Negative. Neurological: Negative. Hematological: Negative. Psychiatric/Behavioral: Negative. Objective:  Vitals:    07/18/23 1823   BP: 122/86   BP Location: Right arm   Patient Position: Sitting   Cuff Size: Large   Pulse: 94   Resp: 14   Temp: 97.9 °F (36.6 °C)   SpO2: 96%   Weight: 84.7 kg (186 lb 12.8 oz)   Height: 5' 5.04" (1.652 m)     Body mass index is 31.05 kg/m². Physical Exam  Vitals and nursing note reviewed. Constitutional:       General: He is not in acute distress. Appearance: He is well-developed. He is not diaphoretic. Eyes:      Pupils: Pupils are equal, round, and reactive to light. Neck:      Thyroid: No thyromegaly. Vascular: No JVD. Trachea: No tracheal deviation. Cardiovascular:      Rate and Rhythm: Normal rate and regular rhythm.       Heart sounds: Normal heart sounds. No murmur heard. No friction rub. No gallop. Pulmonary:      Effort: Pulmonary effort is normal. No respiratory distress. Breath sounds: Normal breath sounds. No stridor. No wheezing or rales. Chest:      Chest wall: No tenderness. Abdominal:      General: Bowel sounds are normal. There is no distension. Palpations: Abdomen is soft. There is no mass. Tenderness: There is no abdominal tenderness. There is no guarding or rebound. Musculoskeletal:         General: Normal range of motion. Cervical back: Normal range of motion and neck supple. Lymphadenopathy:      Cervical: No cervical adenopathy. Skin:     General: Skin is warm and dry. Coloration: Skin is not pale. Findings: Erythema and rash present. Comments: Patient has a macular papular vesicular rash on-it is scattered on his left leg, left arm, neck, and anterior abdomen. Neurological:      Mental Status: He is alert and oriented to person, place, and time. Cranial Nerves: No cranial nerve deficit. Motor: No abnormal muscle tone. Coordination: Coordination normal.      Deep Tendon Reflexes: Reflexes are normal and symmetric. Reflexes normal.             Depression Screening and Follow-up Plan: Patient was screened for depression during today's encounter. They screened negative with a PHQ-2 score of 0. No

## 2023-08-10 ENCOUNTER — LAB REQUISITION (OUTPATIENT)
Dept: LAB | Facility: HOSPITAL | Age: 23
End: 2023-08-10

## 2023-08-10 DIAGNOSIS — Z02.83 ENCOUNTER FOR BLOOD-ALCOHOL AND BLOOD-DRUG TEST: ICD-10-CM

## 2024-02-05 ENCOUNTER — OFFICE VISIT (OUTPATIENT)
Dept: URGENT CARE | Facility: CLINIC | Age: 24
End: 2024-02-05
Payer: COMMERCIAL

## 2024-02-05 VITALS
HEART RATE: 90 BPM | HEIGHT: 65 IN | TEMPERATURE: 99 F | WEIGHT: 189 LBS | DIASTOLIC BLOOD PRESSURE: 74 MMHG | OXYGEN SATURATION: 99 % | BODY MASS INDEX: 31.49 KG/M2 | RESPIRATION RATE: 16 BRPM | SYSTOLIC BLOOD PRESSURE: 124 MMHG

## 2024-02-05 DIAGNOSIS — H10.31 ACUTE CONJUNCTIVITIS OF RIGHT EYE, UNSPECIFIED ACUTE CONJUNCTIVITIS TYPE: Primary | ICD-10-CM

## 2024-02-05 PROCEDURE — 99213 OFFICE O/P EST LOW 20 MIN: CPT | Performed by: PHYSICIAN ASSISTANT

## 2024-02-05 RX ORDER — POLYMYXIN B SULFATE AND TRIMETHOPRIM 1; 10000 MG/ML; [USP'U]/ML
1 SOLUTION OPHTHALMIC EVERY 4 HOURS
Qty: 10 ML | Refills: 0 | Status: SHIPPED | OUTPATIENT
Start: 2024-02-05 | End: 2024-02-06 | Stop reason: ALTCHOICE

## 2024-02-05 NOTE — PROGRESS NOTES
[facility]        NAME: Hector Mcdonnell is a 23 y.o. male  : 2000    MRN: [id number]  DATE: 2024  TIME: 10:24 AM    Assessment and Plan   Acute conjunctivitis of right eye, unspecified acute conjunctivitis type [H10.31]  1. Acute conjunctivitis of right eye, unspecified acute conjunctivitis type  polymyxin b-trimethoprim (POLYTRIM) ophthalmic solution            Patient Instructions       Follow up with PCP in 3-5 days.  Proceed to  ER if symptoms worsen.    Chief Complaint     Chief Complaint   Patient presents with   • Eye Problem     Pt reports right eye redness/drainage that began yesterday. Reports son has conjunctivitis.          History of Present Illness       Patient presents with right eye redness and drainage starting yesterday.  States his son has pinkeye and thinks he gave it to him.  Denies contact lens use, visual disturbances, vision loss, painful eye movements, fever, swelling around the eye.    Eye Problem   Associated symptoms include an eye discharge, eye redness and itching. Pertinent negatives include no fever or photophobia.       Review of Systems   Review of Systems   Constitutional:  Negative for fever.   Eyes:  Positive for discharge, redness and itching. Negative for photophobia, pain and visual disturbance.   Skin:  Negative for rash.       Current Medications       Current Outpatient Medications:   •  polymyxin b-trimethoprim (POLYTRIM) ophthalmic solution, Administer 1 drop to the right eye every 4 (four) hours for 7 days, Disp: 10 mL, Rfl: 0  •  predniSONE 20 mg tablet, Take 3 tablets for 2 days then 2 tablets for 3 days then 1 tablet for 3 days then stop (Patient not taking: Reported on 2024), Disp: 15 tablet, Rfl: 0    Current Allergies     Allergies as of 2024   • (No Known Allergies)            The following portions of the patient's history were reviewed and updated as appropriate: allergies, current medications, past family history, past  "medical history, past social history, past surgical history and problem list.     History reviewed. No pertinent past medical history.    History reviewed. No pertinent surgical history.    Family History   Problem Relation Age of Onset   • Hypertension Father         Essential hypertension          Medications have been verified.        Objective   /74   Pulse 90   Temp 99 °F (37.2 °C)   Resp 16   Ht 5' 5\" (1.651 m)   Wt 85.7 kg (189 lb)   SpO2 99%   BMI 31.45 kg/m²   No LMP for male patient.       Physical Exam     Physical Exam  Constitutional:       Appearance: Normal appearance.   Eyes:      General:         Right eye: Discharge present.      Extraocular Movements: Extraocular movements intact.      Pupils: Pupils are equal, round, and reactive to light.      Comments: Injected and irritated right conjunctiva with crusting around the eyes and clear drainage.   Neurological:      Mental Status: He is alert.   Psychiatric:         Mood and Affect: Mood normal.         Behavior: Behavior normal.                 "

## 2024-02-06 ENCOUNTER — OFFICE VISIT (OUTPATIENT)
Dept: URGENT CARE | Facility: CLINIC | Age: 24
End: 2024-02-06
Payer: COMMERCIAL

## 2024-02-06 VITALS
OXYGEN SATURATION: 100 % | TEMPERATURE: 98.8 F | RESPIRATION RATE: 18 BRPM | SYSTOLIC BLOOD PRESSURE: 139 MMHG | DIASTOLIC BLOOD PRESSURE: 69 MMHG | HEART RATE: 104 BPM

## 2024-02-06 DIAGNOSIS — H10.33 ACUTE CONJUNCTIVITIS OF BOTH EYES, UNSPECIFIED ACUTE CONJUNCTIVITIS TYPE: Primary | ICD-10-CM

## 2024-02-06 PROCEDURE — 99213 OFFICE O/P EST LOW 20 MIN: CPT | Performed by: PHYSICIAN ASSISTANT

## 2024-02-06 RX ORDER — CIPROFLOXACIN HYDROCHLORIDE 3.5 MG/ML
1 SOLUTION/ DROPS TOPICAL
Qty: 2.1 ML | Refills: 0 | Status: SHIPPED | OUTPATIENT
Start: 2024-02-06 | End: 2024-02-13

## 2024-02-07 NOTE — PROGRESS NOTES
[facility]        NAME: Hector Mcdonnell is a 23 y.o. male  : 2000    MRN: [id number]  DATE: 2024  TIME: 7:15 PM    Assessment and Plan   Acute conjunctivitis of both eyes, unspecified acute conjunctivitis type [H10.33]  1. Acute conjunctivitis of both eyes, unspecified acute conjunctivitis type  ciprofloxacin (CILOXAN) 0.3 % ophthalmic solution    Ambulatory Referral to Ophthalmology            Patient Instructions       Follow up with PCP in 3-5 days.  Proceed to  ER if symptoms worsen.    Chief Complaint     Chief Complaint   Patient presents with    Eye Problem     Patient has drastically worsened red eyes since being prescribed eye drops yesterday          History of Present Illness       Patient presents with worsening eye redness in the left eye and now it has spread to the right eye.  Has been using the drops with no relief.  Patient's son and wife had relief with the eyedrops.  Denies visual disturbances or painful eye movements.  Did not get anything in the eye.  Did start using the Polytrim drops in the right eye.    Eye Problem   Associated symptoms include an eye discharge, eye redness and itching. Pertinent negatives include no photophobia.       Review of Systems   Review of Systems   Eyes:  Positive for discharge, redness and itching. Negative for photophobia and visual disturbance.         Current Medications       Current Outpatient Medications:     ciprofloxacin (CILOXAN) 0.3 % ophthalmic solution, Administer 1 drop to both eyes 6 (six) times a day for 7 days, Disp: 2.1 mL, Rfl: 0    predniSONE 20 mg tablet, Take 3 tablets for 2 days then 2 tablets for 3 days then 1 tablet for 3 days then stop (Patient not taking: Reported on 2024), Disp: 15 tablet, Rfl: 0    Current Allergies     Allergies as of 2024    (No Known Allergies)            The following portions of the patient's history were reviewed and updated as appropriate: allergies, current medications, past  family history, past medical history, past social history, past surgical history and problem list.     No past medical history on file.    No past surgical history on file.    Family History   Problem Relation Age of Onset    Hypertension Father         Essential hypertension          Medications have been verified.        Objective   /69   Pulse 104   Temp 98.8 °F (37.1 °C)   Resp 18   SpO2 100%   No LMP for male patient.       Physical Exam     Physical Exam  Constitutional:       Appearance: Normal appearance.   Eyes:      Extraocular Movements: Extraocular movements intact.      Pupils: Pupils are equal, round, and reactive to light.      Comments: injected and irritated left and right conjunctiva with clear drainage and some thicker drainage from the right eye.  Worse on the right side.   Neurological:      Mental Status: He is alert.   Psychiatric:         Mood and Affect: Mood normal.         Behavior: Behavior normal.

## 2024-02-07 NOTE — PATIENT INSTRUCTIONS
Follow-up with ophthalmology in the next 24 to 48 hours.  Get reevaluated if any new or worsening symptoms develop.  Discontinue Polytrim and start ciprofloxacin ophthalmology drops as prescribed.

## 2024-05-07 ENCOUNTER — TELEPHONE (OUTPATIENT)
Age: 24
End: 2024-05-07

## 2024-05-07 ENCOUNTER — OFFICE VISIT (OUTPATIENT)
Dept: URGENT CARE | Facility: CLINIC | Age: 24
End: 2024-05-07
Payer: COMMERCIAL

## 2024-05-07 VITALS
RESPIRATION RATE: 16 BRPM | HEIGHT: 65 IN | SYSTOLIC BLOOD PRESSURE: 120 MMHG | WEIGHT: 189 LBS | BODY MASS INDEX: 31.49 KG/M2 | OXYGEN SATURATION: 99 % | TEMPERATURE: 98.7 F | DIASTOLIC BLOOD PRESSURE: 70 MMHG | HEART RATE: 97 BPM

## 2024-05-07 DIAGNOSIS — L23.7 ALLERGIC CONTACT DERMATITIS DUE TO PLANTS, EXCEPT FOOD: Primary | ICD-10-CM

## 2024-05-07 PROCEDURE — S9083 URGENT CARE CENTER GLOBAL: HCPCS | Performed by: PHYSICIAN ASSISTANT

## 2024-05-07 PROCEDURE — G0382 LEV 3 HOSP TYPE B ED VISIT: HCPCS | Performed by: PHYSICIAN ASSISTANT

## 2024-05-07 RX ORDER — PREDNISONE 10 MG/1
TABLET ORAL
Qty: 30 TABLET | Refills: 0 | Status: SHIPPED | OUTPATIENT
Start: 2024-05-07

## 2024-05-07 NOTE — TELEPHONE ENCOUNTER
Patient called with concerns of possible poison ivy rash.    No available appt.    Advice patient to be seen at .

## 2024-05-07 NOTE — PROGRESS NOTES
[facility]        NAME: Hcetor Mcdonnell is a 23 y.o. male  : 2000    MRN: [id number]  DATE: May 7, 2024  TIME: 3:29 PM    Assessment and Plan   Allergic contact dermatitis due to plants, except food [L23.7]  1. Allergic contact dermatitis due to plants, except food  predniSONE 10 mg tablet            Patient Instructions       Follow up with PCP in 3-5 days.  Proceed to  ER if symptoms worsen.    If tests have been performed at [facility], our office will contact you with results if changes need to be made to the care plan discussed with you at the visit.  You can review your full results on [facility].    Chief Complaint     Chief Complaint   Patient presents with    Rash     Pt reports right hand, bilateral forearm, and left calf skin rash with onset of symptoms three days ago with progression today. C/o itching. Managing with paint thinner.          History of Present Illness       Patient presents with left calf rash 2-3 days ago. Yesterday rash on hands and this morning on forearms.  Areas are really itchy. Started after doing yard work and thinks he came in contact with poison sumac.  Was cutting around poison sumac.     Rash  Pertinent negatives include no shortness of breath.       Review of Systems   Review of Systems   HENT:  Negative for trouble swallowing.    Respiratory:  Negative for shortness of breath.    Skin:  Positive for rash.         Current Medications       Current Outpatient Medications:     predniSONE 10 mg tablet, Take 4 tablets orally for 3 days,  Then 3 tablets for 3 days, then 2 tablets for 3 days, then 1 tablet for 3 days., Disp: 30 tablet, Rfl: 0    Current Allergies     Allergies as of 2024    (No Known Allergies)            The following portions of the patient's history were reviewed and updated as appropriate: allergies, current medications, past family history, past medical history, past social history, past surgical history and problem list.     No past  medical history on file.    No past surgical history on file.    Family History   Problem Relation Age of Onset    Hypertension Father         Essential hypertension          Medications have been verified.        Objective   /70 (BP Location: Left arm, Patient Position: Sitting)   Pulse 97   Temp 98.7 °F (37.1 °C)   Resp 16   Ht 5' 5\" (1.651 m)   Wt 85.7 kg (189 lb)   SpO2 99%   BMI 31.45 kg/m²   No LMP for male patient.       Physical Exam     Physical Exam  Constitutional:       Appearance: Normal appearance.   Skin:     Comments: Dorsum of right hand and right forearm with erythematous vesicular rash in linear appearing pattern and grouped areas.  Similar rash over the left forearm and a similar resolving rash over the left calf.   Neurological:      Mental Status: He is alert.   Psychiatric:         Mood and Affect: Mood normal.         Behavior: Behavior normal.                   "

## 2025-04-14 ENCOUNTER — HOSPITAL ENCOUNTER (INPATIENT)
Facility: HOSPITAL | Age: 25
LOS: 1 days | Discharge: HOME | End: 2025-04-15
Attending: INTERNAL MEDICINE | Admitting: INTERNAL MEDICINE
Payer: COMMERCIAL

## 2025-04-14 ENCOUNTER — HOSPITAL ENCOUNTER (EMERGENCY)
Facility: HOSPITAL | Age: 25
End: 2025-04-14
Attending: INTERNAL MEDICINE
Payer: COMMERCIAL

## 2025-04-14 ENCOUNTER — APPOINTMENT (EMERGENCY)
Dept: CT IMAGING | Facility: HOSPITAL | Age: 25
End: 2025-04-14
Payer: COMMERCIAL

## 2025-04-14 ENCOUNTER — OFFICE VISIT (OUTPATIENT)
Dept: URGENT CARE | Facility: CLINIC | Age: 25
End: 2025-04-14
Payer: COMMERCIAL

## 2025-04-14 VITALS
BODY MASS INDEX: 31.45 KG/M2 | DIASTOLIC BLOOD PRESSURE: 69 MMHG | HEART RATE: 92 BPM | OXYGEN SATURATION: 98 % | SYSTOLIC BLOOD PRESSURE: 124 MMHG | TEMPERATURE: 98.8 F | WEIGHT: 189 LBS | RESPIRATION RATE: 16 BRPM

## 2025-04-14 VITALS
HEART RATE: 106 BPM | HEIGHT: 65 IN | DIASTOLIC BLOOD PRESSURE: 80 MMHG | RESPIRATION RATE: 16 BRPM | OXYGEN SATURATION: 98 % | WEIGHT: 189 LBS | TEMPERATURE: 99 F | SYSTOLIC BLOOD PRESSURE: 138 MMHG | BODY MASS INDEX: 31.49 KG/M2

## 2025-04-14 DIAGNOSIS — J02.9 SORE THROAT: ICD-10-CM

## 2025-04-14 DIAGNOSIS — L03.211 FACIAL CELLULITIS: Primary | ICD-10-CM

## 2025-04-14 DIAGNOSIS — R22.0 RIGHT FACIAL SWELLING: Primary | ICD-10-CM

## 2025-04-14 DIAGNOSIS — R25.2 TRISMUS: ICD-10-CM

## 2025-04-14 DIAGNOSIS — K08.89 PAIN, DENTAL: ICD-10-CM

## 2025-04-14 PROBLEM — Z72.0 TOBACCO USER: Status: ACTIVE | Noted: 2025-04-14

## 2025-04-14 LAB
ALBUMIN SERPL BCG-MCNC: 4.8 G/DL (ref 3.5–5)
ALP SERPL-CCNC: 66 U/L (ref 34–104)
ALT SERPL W P-5'-P-CCNC: 27 U/L (ref 7–52)
ANION GAP SERPL CALCULATED.3IONS-SCNC: 10 MMOL/L (ref 4–13)
APTT PPP: 29 SECONDS (ref 23–34)
AST SERPL W P-5'-P-CCNC: 17 U/L (ref 13–39)
BASOPHILS # BLD AUTO: 0.05 THOUSANDS/ÂΜL (ref 0–0.1)
BASOPHILS NFR BLD AUTO: 0 % (ref 0–1)
BILIRUB SERPL-MCNC: 0.77 MG/DL (ref 0.2–1)
BUN SERPL-MCNC: 9 MG/DL (ref 5–25)
CALCIUM SERPL-MCNC: 9.4 MG/DL (ref 8.4–10.2)
CHLORIDE SERPL-SCNC: 102 MMOL/L (ref 96–108)
CO2 SERPL-SCNC: 28 MMOL/L (ref 21–32)
CREAT SERPL-MCNC: 0.69 MG/DL (ref 0.6–1.3)
EOSINOPHIL # BLD AUTO: 0.02 THOUSAND/ÂΜL (ref 0–0.61)
EOSINOPHIL NFR BLD AUTO: 0 % (ref 0–6)
ERYTHROCYTE [DISTWIDTH] IN BLOOD BY AUTOMATED COUNT: 13.2 % (ref 11.6–15.1)
GFR SERPL CREATININE-BSD FRML MDRD: 131 ML/MIN/1.73SQ M
GLUCOSE SERPL-MCNC: 104 MG/DL (ref 65–140)
HCT VFR BLD AUTO: 46.3 % (ref 36.5–49.3)
HGB BLD-MCNC: 15.3 G/DL (ref 12–17)
IMM GRANULOCYTES # BLD AUTO: 0.08 THOUSAND/UL (ref 0–0.2)
IMM GRANULOCYTES NFR BLD AUTO: 1 % (ref 0–2)
INR PPP: 0.97 (ref 0.85–1.19)
LACTATE SERPL-SCNC: 1.2 MMOL/L (ref 0.5–2)
LYMPHOCYTES # BLD AUTO: 1.31 THOUSANDS/ÂΜL (ref 0.6–4.47)
LYMPHOCYTES NFR BLD AUTO: 10 % (ref 14–44)
MCH RBC QN AUTO: 29.9 PG (ref 26.8–34.3)
MCHC RBC AUTO-ENTMCNC: 33 G/DL (ref 31.4–37.4)
MCV RBC AUTO: 91 FL (ref 82–98)
MONOCYTES # BLD AUTO: 1.16 THOUSAND/ÂΜL (ref 0.17–1.22)
MONOCYTES NFR BLD AUTO: 9 % (ref 4–12)
NEUTROPHILS # BLD AUTO: 10.48 THOUSANDS/ÂΜL (ref 1.85–7.62)
NEUTS SEG NFR BLD AUTO: 80 % (ref 43–75)
NRBC BLD AUTO-RTO: 0 /100 WBCS
PLATELET # BLD AUTO: 238 THOUSANDS/UL (ref 149–390)
PMV BLD AUTO: 9 FL (ref 8.9–12.7)
POTASSIUM SERPL-SCNC: 3.8 MMOL/L (ref 3.5–5.3)
PROCALCITONIN SERPL-MCNC: <0.05 NG/ML
PROT SERPL-MCNC: 7.7 G/DL (ref 6.4–8.4)
PROTHROMBIN TIME: 13.3 SECONDS (ref 12.3–15)
RBC # BLD AUTO: 5.11 MILLION/UL (ref 3.88–5.62)
S PYO AG THROAT QL: NEGATIVE
SODIUM SERPL-SCNC: 140 MMOL/L (ref 135–147)
WBC # BLD AUTO: 13.1 THOUSAND/UL (ref 4.31–10.16)

## 2025-04-14 PROCEDURE — 99285 EMERGENCY DEPT VISIT HI MDM: CPT

## 2025-04-14 PROCEDURE — 87040 BLOOD CULTURE FOR BACTERIA: CPT

## 2025-04-14 PROCEDURE — 96375 TX/PRO/DX INJ NEW DRUG ADDON: CPT

## 2025-04-14 PROCEDURE — 85610 PROTHROMBIN TIME: CPT

## 2025-04-14 PROCEDURE — 83605 ASSAY OF LACTIC ACID: CPT

## 2025-04-14 PROCEDURE — 96374 THER/PROPH/DIAG INJ IV PUSH: CPT

## 2025-04-14 PROCEDURE — 80053 COMPREHEN METABOLIC PANEL: CPT

## 2025-04-14 PROCEDURE — 85025 COMPLETE CBC W/AUTO DIFF WBC: CPT

## 2025-04-14 PROCEDURE — 96361 HYDRATE IV INFUSION ADD-ON: CPT

## 2025-04-14 PROCEDURE — G0382 LEV 3 HOSP TYPE B ED VISIT: HCPCS | Performed by: PHYSICIAN ASSISTANT

## 2025-04-14 PROCEDURE — 85730 THROMBOPLASTIN TIME PARTIAL: CPT

## 2025-04-14 PROCEDURE — 87070 CULTURE OTHR SPECIMN AEROBIC: CPT | Performed by: PHYSICIAN ASSISTANT

## 2025-04-14 PROCEDURE — 99284 EMERGENCY DEPT VISIT MOD MDM: CPT

## 2025-04-14 PROCEDURE — 84145 PROCALCITONIN (PCT): CPT

## 2025-04-14 PROCEDURE — 70487 CT MAXILLOFACIAL W/DYE: CPT

## 2025-04-14 PROCEDURE — 96376 TX/PRO/DX INJ SAME DRUG ADON: CPT

## 2025-04-14 PROCEDURE — 99222 1ST HOSP IP/OBS MODERATE 55: CPT | Performed by: INTERNAL MEDICINE

## 2025-04-14 PROCEDURE — 87147 CULTURE TYPE IMMUNOLOGIC: CPT | Performed by: PHYSICIAN ASSISTANT

## 2025-04-14 PROCEDURE — S9083 URGENT CARE CENTER GLOBAL: HCPCS | Performed by: PHYSICIAN ASSISTANT

## 2025-04-14 PROCEDURE — 36415 COLL VENOUS BLD VENIPUNCTURE: CPT

## 2025-04-14 RX ORDER — OXYCODONE HYDROCHLORIDE 5 MG/1
5 TABLET ORAL EVERY 4 HOURS PRN
Refills: 0 | Status: DISCONTINUED | OUTPATIENT
Start: 2025-04-14 | End: 2025-04-15 | Stop reason: HOSPADM

## 2025-04-14 RX ORDER — MORPHINE SULFATE 4 MG/ML
4 INJECTION, SOLUTION INTRAMUSCULAR; INTRAVENOUS ONCE
Status: COMPLETED | OUTPATIENT
Start: 2025-04-14 | End: 2025-04-14

## 2025-04-14 RX ORDER — KETOROLAC TROMETHAMINE 30 MG/ML
15 INJECTION, SOLUTION INTRAMUSCULAR; INTRAVENOUS ONCE
Status: COMPLETED | OUTPATIENT
Start: 2025-04-14 | End: 2025-04-14

## 2025-04-14 RX ORDER — KETOROLAC TROMETHAMINE 30 MG/ML
15 INJECTION, SOLUTION INTRAMUSCULAR; INTRAVENOUS EVERY 6 HOURS SCHEDULED
Status: DISCONTINUED | OUTPATIENT
Start: 2025-04-15 | End: 2025-04-15 | Stop reason: HOSPADM

## 2025-04-14 RX ORDER — ENOXAPARIN SODIUM 100 MG/ML
40 INJECTION SUBCUTANEOUS DAILY
Status: DISCONTINUED | OUTPATIENT
Start: 2025-04-15 | End: 2025-04-15 | Stop reason: HOSPADM

## 2025-04-14 RX ORDER — ACETAMINOPHEN 325 MG/1
650 TABLET ORAL EVERY 6 HOURS PRN
Status: DISCONTINUED | OUTPATIENT
Start: 2025-04-14 | End: 2025-04-15 | Stop reason: HOSPADM

## 2025-04-14 RX ORDER — OXYCODONE HYDROCHLORIDE 10 MG/1
10 TABLET ORAL EVERY 4 HOURS PRN
Refills: 0 | Status: DISCONTINUED | OUTPATIENT
Start: 2025-04-14 | End: 2025-04-15 | Stop reason: HOSPADM

## 2025-04-14 RX ORDER — ACETAMINOPHEN 10 MG/ML
1000 INJECTION, SOLUTION INTRAVENOUS ONCE
Status: COMPLETED | OUTPATIENT
Start: 2025-04-14 | End: 2025-04-14

## 2025-04-14 RX ORDER — ONDANSETRON 2 MG/ML
4 INJECTION INTRAMUSCULAR; INTRAVENOUS EVERY 6 HOURS PRN
Status: DISCONTINUED | OUTPATIENT
Start: 2025-04-14 | End: 2025-04-15 | Stop reason: HOSPADM

## 2025-04-14 RX ADMIN — AMPICILLIN SODIUM AND SULBACTAM SODIUM 3 G: 2; 1 INJECTION, POWDER, FOR SOLUTION INTRAMUSCULAR; INTRAVENOUS at 13:17

## 2025-04-14 RX ADMIN — SODIUM CHLORIDE 1000 ML: 0.9 INJECTION, SOLUTION INTRAVENOUS at 13:09

## 2025-04-14 RX ADMIN — ACETAMINOPHEN 1000 MG: 10 INJECTION INTRAVENOUS at 17:36

## 2025-04-14 RX ADMIN — MORPHINE SULFATE 4 MG: 4 INJECTION, SOLUTION INTRAMUSCULAR; INTRAVENOUS at 13:14

## 2025-04-14 RX ADMIN — OXYCODONE HYDROCHLORIDE 10 MG: 10 TABLET ORAL at 21:35

## 2025-04-14 RX ADMIN — KETOROLAC TROMETHAMINE 15 MG: 30 INJECTION, SOLUTION INTRAMUSCULAR; INTRAVENOUS at 13:14

## 2025-04-14 RX ADMIN — KETOROLAC TROMETHAMINE 15 MG: 30 INJECTION, SOLUTION INTRAMUSCULAR; INTRAVENOUS at 23:29

## 2025-04-14 RX ADMIN — IOHEXOL 85 ML: 350 INJECTION, SOLUTION INTRAVENOUS at 13:39

## 2025-04-14 RX ADMIN — MORPHINE SULFATE 4 MG: 4 INJECTION, SOLUTION INTRAMUSCULAR; INTRAVENOUS at 15:50

## 2025-04-14 RX ADMIN — AMPICILLIN SODIUM AND SULBACTAM SODIUM 3 G: 100; 50 INJECTION, POWDER, FOR SOLUTION INTRAVENOUS at 22:19

## 2025-04-14 NOTE — EMTALA/ACUTE CARE TRANSFER
[facility] EMERGENCY DEPARTMENT  [address]  [place] [address]  Dept: [phone]      EMTALA TRANSFER CONSENT    NAME Hector Mcdonnell                                         2000                              MRN [id number]    I have been informed of my rights regarding examination, treatment, and transfer   by Dr. Rudy Oates DO    Benefits: Specialized equipment and/or services available at the receiving facility (Include comment)________________________    Risks: Potential for delay in receiving treatment, Potential deterioration of medical condition, Loss of IV, Increased discomfort during transfer, Possible worsening of condition or death during transfer      Transfer Request   I acknowledge that my medical condition has been evaluated and explained to me by the emergency department physician or other qualified medical person and/or my attending physician who has recommended and offered to me further medical examination and treatment. I understand the Hospital's obligation with respect to the treatment and stabilization of my emergency medical condition. I nevertheless request to be transferred. I release the Hospital, the doctor, and any other persons caring for me from all responsibility or liability for any injury or ill effects that may result from my transfer and agree to accept all responsibility for the consequences of my choice to transfer, rather than receive stabilizing treatment at the Hospital. I understand that because the transfer is my request, my insurance may not provide reimbursement for the services.  The Hospital will assist and direct me and my family in how to make arrangements for transfer, but the hospital is not liable for any fees charged by the transport service.  In spite of this understanding, I refuse to consent to further medical examination and treatment which has been offered to me, and request transfer to  . I authorize the performance  of emergency medical procedures and treatments upon me in both transit and upon arrival at the receiving facility.  Additionally, I authorize the release of any and all medical records to the receiving facility and request they be transported with me, if possible.    I authorize the performance of emergency medical procedures and treatments upon me in both transit and upon arrival at the receiving facility.  Additionally, I authorize the release of any and all medical records to the receiving facility and request they be transported with me, if possible.  I understand that the safest mode of transportation during a medical emergency is an ambulance and that the Hospital advocates the use of this mode of transport. Risks of traveling to the receiving facility by car, including absence of medical control, life sustaining equipment, such as oxygen, and medical personnel has been explained to me and I fully understand them.    (DANIELLE CORRECT BOX BELOW)  [  ]  I consent to the stated transfer and to be transported by ambulance/helicopter.  [  ]  I consent to the stated transfer, but refuse transportation by ambulance and accept full responsibility for my transportation by car.  I understand the risks of non-ambulance transfers and I exonerate the Hospital and its staff from any deterioration in my condition that results from this refusal.    X___________________________________________    DATE  25  TIME________  Signature of patient or legally responsible individual signing on patient behalf           RELATIONSHIP TO PATIENT_________________________          Provider Certification    NAME Hector Mcdonnell                                        [facility] 2000                              MRN [id number]    A medical screening exam was performed on the above named patient.  Based on the examination:    Condition Necessitating Transfer The primary encounter diagnosis was Facial cellulitis. A diagnosis of Pain, dental was also  pertinent to this visit.    Patient Condition: The patient has been stabilized such that within reasonable medical probability, no material deterioration of the patient condition or the condition of the unborn child(nathalie) is likely to result from the transfer    Reason for Transfer: Level of Care needed not available at this facility, Patient/Family request, No bed available at level of patient's needs, Third party payor request    Transfer Requirements: Facility     Space available and qualified personnel available for treatment as acknowledged by    Agreed to accept transfer and to provide appropriate medical treatment as acknowledged by          Appropriate medical records of the examination and treatment of the patient are provided at the time of transfer   STAFF INITIAL WHEN COMPLETED _______  Transfer will be performed by qualified personnel from    and appropriate transfer equipment as required, including the use of necessary and appropriate life support measures.    Provider Certification: I have examined the patient and explained the following risks and benefits of being transferred/refusing transfer to the patient/family:         Based on these reasonable risks and benefits to the patient and/or the unborn child(nathalie), and based upon the information available at the time of the patient’s examination, I certify that the medical benefits reasonably to be expected from the provision of appropriate medical treatments at another medical facility outweigh the increasing risks, if any, to the individual’s medical condition, and in the case of labor to the unborn child, from effecting the transfer.    X____________________________________________ DATE 04/14/25        TIME_______      ORIGINAL - SEND TO MEDICAL RECORDS   COPY - SEND WITH PATIENT DURING TRANSFER

## 2025-04-14 NOTE — H&P
H&P - Hospitalist   Name: Hector Mcdonnell 24 y.o. male I MRN: [id number]  Unit/Bed#:  I Date of Admission: (Not on file)   Date of Service: 4/14/2025 I Hospital Day: 0     Assessment & Plan  Facial cellulitis  Likely due to dental infection  Transferred to [facility] for an OMS evaluation  Start IV unasyn 3 gram q6h  IVF and analgesics PRN  Toradol 15mg q6h x 1 day  Consult with [facility] for possible dental extraction  Monitor temps, WBCs  Follow up blood and throat cultures  Tobacco user  Declines a nicotine patch while hospitalized      VTE Pharmacologic Prophylaxis:   Low Risk (Score 0-2) - Encourage Ambulation.  Code Status: No Order   Discussion with family: Updated  (wife) at bedside.    Anticipated Length of Stay: Patient will be admitted on an inpatient basis with an anticipated length of stay of greater than 2 midnights secondary to possible OR tomorrow.    History of Present Illness   Chief Complaint: facial pain    Hector Mcdonnell is a 24 y.o. male with a PMH of tobacco use who presents with facial pain. He reports taking ibuprofen without relief. Due to worsening swelling he presented to the ED. There he had a CT scan which showed facial cellulitis without abscess. Due to a lack of OMS services he was transferred to [facility] for further care. He denies any fever or chills.    Review of Systems   All other systems reviewed and are negative.      Historical Information   No past medical history on file.  No past surgical history on file.  Social History     Tobacco Use    Smoking status: Every Day     Current packs/day: 0.50     Types: Cigarettes     Passive exposure: Past    Smokeless tobacco: Never   Vaping Use    Vaping status: Never Used   Substance and Sexual Activity    Alcohol use: Yes    Drug use: Yes     Types: Marijuana    Sexual activity: Not Currently     E-Cigarette/Vaping    E-Cigarette Use Never User      E-Cigarette/Vaping Substances     Family history non-contributory  Social History:  Marital Status:  Registered Domestic Partner   Occupation:   Patient Pre-hospital Living Situation: Home  Patient Pre-hospital Level of Mobility: walks  Patient Pre-hospital Diet Restrictions: none    Meds/Allergies   I have reviewed home medications with patient personally.  Prior to Admission medications    Medication Sig Start Date End Date Taking? Authorizing Provider   predniSONE 10 mg tablet Take 4 tablets orally for 3 days,  Then 3 tablets for 3 days, then 2 tablets for 3 days, then 1 tablet for 3 days.  Patient not taking: Reported on 4/14/2025 5/7/24   Jey Ghotra PA-C     No Known Allergies    Objective :  Temp:  [98.8 °F (37.1 °C)-99 °F (37.2 °C)] 98.8 °F (37.1 °C)  HR:  [] 95  BP: (121-138)/(66-83) 121/78  Resp:  [16-20] 16  SpO2:  [97 %-98 %] 97 %  O2 Device: None (Room air)    Physical Exam  Constitutional:       Appearance: Normal appearance.   HENT:      Head: Normocephalic and atraumatic.      Comments: Facial edema      Nose: Nose normal.      Mouth/Throat:      Mouth: Mucous membranes are moist.      Pharynx: Oropharynx is clear. No oropharyngeal exudate or posterior oropharyngeal erythema.      Comments: trismus  Cardiovascular:      Rate and Rhythm: Normal rate and regular rhythm.   Pulmonary:      Effort: Pulmonary effort is normal.      Breath sounds: No wheezing or rales.   Musculoskeletal:      Cervical back: No rigidity or tenderness.   Lymphadenopathy:      Cervical: Cervical adenopathy present.   Skin:     General: Skin is warm and dry.   Neurological:      General: No focal deficit present.      Mental Status: He is alert and oriented to person, place, and time.   Psychiatric:         Mood and Affect: Mood normal.         Behavior: Behavior normal.          Lines/Drains:            Lab Results: I have reviewed the following results:  Results from last 7 days   Lab Units 04/14/25  1204   WBC Thousand/uL 13.10*   HEMOGLOBIN g/dL 15.3   HEMATOCRIT % 46.3   PLATELETS Thousands/uL 238   SEGS  "PCT % 80*   LYMPHO PCT % 10*   MONO PCT % 9   EOS PCT % 0     Results from last 7 days   Lab Units 04/14/25  1204   SODIUM mmol/L 140   POTASSIUM mmol/L 3.8   CHLORIDE mmol/L 102   CO2 mmol/L 28   BUN mg/dL 9   CREATININE mg/dL 0.69   ANION GAP mmol/L 10   CALCIUM mg/dL 9.4   ALBUMIN g/dL 4.8   TOTAL BILIRUBIN mg/dL 0.77   ALK PHOS U/L 66   ALT U/L 27   AST U/L 17   GLUCOSE RANDOM mg/dL 104     Results from last 7 days   Lab Units 04/14/25  1307   INR  0.97         No results found for: \"HGBA1C\"  Results from last 7 days   Lab Units 04/14/25  1307   LACTIC ACID mmol/L 1.2   PROCALCITONIN ng/ml <0.05       Imaging Results Review: I reviewed radiology reports from this admission including: CT head.  Other Study Results Review: No additional pertinent studies reviewed.    Administrative Statements   I have spent a total time of 45 minutes in caring for this patient on the day of the visit/encounter including Diagnostic results, Instructions for management, Patient and family education, Impressions, Counseling / Coordination of care, Documenting in the medical record, Reviewing/placing orders in the medical record (including tests, medications, and/or procedures), Obtaining or reviewing history  , and Communicating with other healthcare professionals .    ** Please Note: This note has been constructed using a voice recognition system. **    "

## 2025-04-14 NOTE — PROGRESS NOTES
"[facility]        NAME: Hector Mcdonnell is a 24 y.o. male  : 2000    MRN: [id number]  DATE: 2025  TIME: 11:32 AM    /80 (BP Location: Right arm, Patient Position: Sitting, Cuff Size: Standard)   Pulse (!) 106   Temp 99 °F (37.2 °C) (Tympanic)   Resp 16   Ht 5' 5\" (1.651 m)   Wt 85.7 kg (189 lb)   SpO2 98%   BMI 31.45 kg/m²     Assessment and Plan   Sore throat [J02.9]  1. Sore throat  Transfer to other facility      2. Trismus  Transfer to other facility      3. Right facial swelling  Transfer to other facility            Patient Instructions       Follow up with PCP in 3-5 days.  Proceed to  ER if symptoms worsen.    Chief Complaint     Chief Complaint   Patient presents with    Facial Swelling     Pt presents with right sided facial swelling with onset of symptoms Saturday. C/o jaw, oral, and throat pain. Managing with Ibuprofen. C/o upper mandible tooth pain. States recewnt strep exposure.          History of Present Illness       Pt with 2 days sore throat on right side , worse sore throat he has ever had, right facial swelling and pain         Review of Systems   Review of Systems   Constitutional: Negative.    HENT:  Positive for sore throat.    Eyes: Negative.    Respiratory: Negative.     Cardiovascular: Negative.    Gastrointestinal: Negative.    Endocrine: Negative.    Genitourinary: Negative.    Musculoskeletal: Negative.    Skin: Negative.    Allergic/Immunologic: Negative.    Neurological: Negative.    Hematological: Negative.    Psychiatric/Behavioral: Negative.     All other systems reviewed and are negative.        Current Medications       Current Outpatient Medications:     predniSONE 10 mg tablet, Take 4 tablets orally for 3 days,  Then 3 tablets for 3 days, then 2 tablets for 3 days, then 1 tablet for 3 days. (Patient not taking: Reported on 2025), Disp: 30 tablet, Rfl: 0    Current Allergies     Allergies as of 2025    (No Known Allergies)        " "    The following portions of the patient's history were reviewed and updated as appropriate: allergies, current medications, past family history, past medical history, past social history, past surgical history and problem list.     No past medical history on file.    No past surgical history on file.    Family History   Problem Relation Age of Onset    Hypertension Father         Essential hypertension          Medications have been verified.        Objective   /80 (BP Location: Right arm, Patient Position: Sitting, Cuff Size: Standard)   Pulse (!) 106   Temp 99 °F (37.2 °C) (Tympanic)   Resp 16   Ht 5' 5\" (1.651 m)   Wt 85.7 kg (189 lb)   SpO2 98%   BMI 31.45 kg/m²        Physical Exam     Physical Exam  Vitals and nursing note reviewed.   Constitutional:       Appearance: Normal appearance. He is normal weight.      Comments: With unilateral worst sore throat ever, trismus , and slight change in voice, pt will go to er for further eval , possible ct scan, iv meds, blood eval    HENT:      Head: Normocephalic and atraumatic.      Right Ear: Tympanic membrane, ear canal and external ear normal.      Left Ear: Tympanic membrane, ear canal and external ear normal.      Nose: Nose normal.      Mouth/Throat:      Mouth: Mucous membranes are moist.      Pharynx: Oropharynx is clear. Posterior oropharyngeal erythema present.      Comments: + trismus,  slight change in voice  Right pharyngeal erythema uvula midline   Right palate erythema     Right cheek and facial pain and swelling no dental pain to palpation   Eyes:      Conjunctiva/sclera: Conjunctivae normal.      Pupils: Pupils are equal, round, and reactive to light.   Cardiovascular:      Rate and Rhythm: Normal rate and regular rhythm.      Pulses: Normal pulses.      Heart sounds: Normal heart sounds.   Pulmonary:      Effort: Pulmonary effort is normal.      Breath sounds: Normal breath sounds.   Abdominal:      General: Abdomen is flat.      " Palpations: Abdomen is soft.   Musculoskeletal:         General: Normal range of motion.      Cervical back: Normal range of motion and neck supple.   Skin:     General: Skin is warm.      Capillary Refill: Capillary refill takes less than 2 seconds.   Neurological:      Mental Status: He is alert and oriented to person, place, and time.

## 2025-04-14 NOTE — ED NOTES
for transfer to [facility] at 2030 via [facility]. Number for report [phone].        Meagan Paige RN  04/14/25 [id number]

## 2025-04-14 NOTE — ASSESSMENT & PLAN NOTE
Likely due to dental infection  Transferred to [facility] for an OMS evaluation  Start IV unasyn 3 gram q6h  IVF and analgesics PRN  Toradol 15mg q6h x 1 day  Consult with [facility] for possible dental extraction  Monitor temps, WBCs  Follow up blood and throat cultures

## 2025-04-14 NOTE — ED PROVIDER NOTES
Time reflects when diagnosis was documented in both MDM as applicable and the Disposition within this note       Time User Action Codes Description Comment    4/14/2025  2:54 PM Sommer Duff Add [L03.211] Facial cellulitis     4/14/2025  2:55 PM Sommer Duff Add [K08.89] Pain, dental           ED Disposition       ED Disposition   Transfer to Another Facility-In Network    Condition   --    Date/Time   Mon Apr 14, 2025  2:54 PM    Comment   Hector Mcdonnell should be transferred out to [facility]               Assessment & Plan       Medical Decision Making  DDx: periapical abscess, facial cellulitis, preseptal cellulitis  Will order CT facial bones due to swelling of right side of face, dental pain, and trismus. Analgesia ordered. Will start antibiotics. Low suspicion for Nicolas's based on exam. Tachycardia likely related to pain.   Noted to have a leukocytosis on fn blood work in . No anemia, no jose eduardo, no gross electrolyte abnormality. Procal and lactic wnl.   CT findings discussed with Dr. Gomez. Due to continued trismus, and ct findings feel necessary for transfer to [facility] for OMS. Patient in agreement with transfer plan. Patient stable at time of transfer, maintaining own airway and secretions throughout evaluation.   Reviewed reasons to return to ed.  Patient verbalized understanding of diagnosis and agreement with discharge plan of care as well as understanding of reasons to return to ed.      Amount and/or Complexity of Data Reviewed  Labs: ordered.  Radiology: ordered.    Risk  Prescription drug management.        ED Course as of 04/14/25 2207   Mon Apr 14, 2025   1452 Patient remains with trismus although slightly improved after pain medicine.        Medications   ampicillin-sulbactam (UNASYN) in sodium chloride 0.9% 3 g (3 g Intravenous Given 4/14/25 1317)   sodium chloride 0.9 % bolus 1,000 mL (0 mL Intravenous Stopped 4/14/25 1508)   morphine injection 4 mg (4 mg Intravenous Given 4/14/25  1314)   ketorolac (TORADOL) injection 15 mg (15 mg Intravenous Given 4/14/25 1314)   iohexol (OMNIPAQUE) 350 MG/ML injection (MULTI-DOSE) 85 mL (85 mL Intravenous Given 4/14/25 1339)   morphine injection 4 mg (4 mg Intravenous Given 4/14/25 1550)   acetaminophen (Ofirmev) injection 1,000 mg (0 mg Intravenous Stopped 4/14/25 1751)       ED Risk Strat Scores                    No data recorded        SBIRT 22yo+      Flowsheet Row Most Recent Value   Initial Alcohol Screen: US AUDIT-C     1. How often do you have a drink containing alcohol? 0 Filed at: 04/14/2025 1203   2. How many drinks containing alcohol do you have on a typical day you are drinking?  0 Filed at: 04/14/2025 1203   3a. Male UNDER 65: How often do you have five or more drinks on one occasion? 0 Filed at: 04/14/2025 1203   3b. FEMALE Any Age, or MALE 65+: How often do you have 4 or more drinks on one occassion? 0 Filed at: 04/14/2025 1203   Audit-C Score 0 Filed at: 04/14/2025 1203   HERNANDO: How many times in the past year have you...    Used an illegal drug or used a prescription medication for non-medical reasons? Never Filed at: 04/14/2025 1203                            History of Present Illness       Chief Complaint   Patient presents with    Abscess     Abscess to R side of mouth that started Saturday. Painful to swallow, pain down into neck. Unable to open mouth enough to brush back of teeth.        History reviewed. No pertinent past medical history.   History reviewed. No pertinent surgical history.   Family History   Problem Relation Age of Onset    Hypertension Father         Essential hypertension       Social History     Tobacco Use    Smoking status: Every Day     Current packs/day: 0.50     Average packs/day: 0.5 packs/day for 0.3 years (0.1 ttl pk-yrs)     Types: Cigarettes     Start date: 2025     Passive exposure: Past    Smokeless tobacco: Never   Vaping Use    Vaping status: Never Used   Substance Use Topics    Alcohol use: Never     Drug use: Not Currently     Types: Marijuana     Comment: has not used in last 2 years      E-Cigarette/Vaping    E-Cigarette Use Never User       E-Cigarette/Vaping Substances    Nicotine No     THC No     CBD No     Flavoring No     Other No     Unknown No       I have reviewed and agree with the history as documented.     Patient is a 23 yo M arriving for evaluation of right upper tooth pain and jaw pain. Patient reports right upper jaw pain on Saturday. Noted on Sunday to have significant swelling. NO direct trauma. Reports subjective fever. Patient has trismus. Patient has no periorbital swelling or tenderness. Patient does have right sided jaw swelling. Patient has no obvious tooth fracture. Limited exam by ability to open mouth. Patient has no tracheal tenderness. No floor of mouth swelling. Patient reports is a smoker.         Review of Systems   Constitutional: Negative.    HENT:  Positive for dental problem. Negative for congestion, drooling, ear discharge, ear pain and facial swelling.    Eyes: Negative.    Respiratory: Negative.     Cardiovascular: Negative.    Gastrointestinal: Negative.    Endocrine: Negative.    Genitourinary: Negative.    Musculoskeletal: Negative.    Skin: Negative.    Allergic/Immunologic: Negative.    Neurological: Negative.    Hematological: Negative.    Psychiatric/Behavioral: Negative.     All other systems reviewed and are negative.          Objective       ED Triage Vitals [04/14/25 1202]   Temperature Pulse Blood Pressure Respirations SpO2 Patient Position - Orthostatic VS   98.8 °F (37.1 °C) (!) 113 135/83 18 98 % Sitting      Temp Source Heart Rate Source BP Location FiO2 (%) Pain Score    Temporal Monitor Left arm -- 8      Vitals      Date and Time Temp Pulse SpO2 Resp BP Pain Score FACES Pain Rating User   04/14/25 1826 -- 92 98 % 16 124/69 -- -- AD   04/14/25 1550 -- -- -- -- -- 7 -- AD   04/14/25 1426 -- -- -- -- -- 4 --    04/14/25 1425 -- 95 97 % 16 121/78 -- --     04/14/25 1322 -- 91 97 % 20 124/66 -- --    04/14/25 1314 -- -- -- -- -- 7 --    04/14/25 1202 98.8 °F (37.1 °C) 113 98 % 18 135/83 8 -- ML            Physical Exam  Vitals and nursing note reviewed.   Constitutional:       Appearance: Normal appearance. He is normal weight.   HENT:      Head: Normocephalic.      Right Ear: External ear normal.      Left Ear: External ear normal.      Nose: Nose normal.      Mouth/Throat:      Mouth: Mucous membranes are moist.      Dentition: Dental tenderness and gingival swelling present.      Tongue: No lesions.      Pharynx: Oropharynx is clear. Uvula midline.     Eyes:      Extraocular Movements: Extraocular movements intact.      Conjunctiva/sclera: Conjunctivae normal.      Pupils: Pupils are equal, round, and reactive to light.   Cardiovascular:      Rate and Rhythm: Normal rate and regular rhythm.      Pulses: Normal pulses.      Heart sounds: Normal heart sounds.   Pulmonary:      Effort: Pulmonary effort is normal.      Breath sounds: Normal breath sounds.   Abdominal:      General: Abdomen is flat. Bowel sounds are normal.      Palpations: Abdomen is soft.   Musculoskeletal:         General: Normal range of motion.      Cervical back: Normal range of motion and neck supple.   Skin:     General: Skin is warm.      Capillary Refill: Capillary refill takes less than 2 seconds.   Neurological:      General: No focal deficit present.      Mental Status: He is alert. Mental status is at baseline.   Psychiatric:         Mood and Affect: Mood normal.         Behavior: Behavior normal.         Thought Content: Thought content normal.         Judgment: Judgment normal.         Results Reviewed       Procedure Component Value Units Date/Time    Blood culture #1 [[id number]] Collected: 04/14/25 1307    Lab Status: Preliminary result Specimen: Blood from Arm, Right Updated: 04/14/25 8482     Blood Culture Received in Microbiology Lab. Culture in Progress.    Blood culture #2  [[id number]] Collected: 04/14/25 1307    Lab Status: Preliminary result Specimen: Blood from Arm, Right Updated: 04/14/25 1701     Blood Culture Received in Microbiology Lab. Culture in Progress.    Procalcitonin [[id number]]  (Normal) Collected: 04/14/25 1307    Lab Status: Final result Specimen: Blood from Arm, Right Updated: 04/14/25 1338     Procalcitonin <0.05 ng/ml     Lactic acid [[id number]]  (Normal) Collected: 04/14/25 1307    Lab Status: Final result Specimen: Blood from Arm, Right Updated: 04/14/25 1328     LACTIC ACID 1.2 mmol/L     Narrative:      Result may be elevated if tourniquet was used during collection.    Protime-INR [[id number]]  (Normal) Collected: 04/14/25 1307    Lab Status: Final result Specimen: Blood from Arm, Right Updated: 04/14/25 1328     Protime 13.3 seconds      INR 0.97    Narrative:      INR Therapeutic Range    Indication                                             INR Range      Atrial Fibrillation                                               2.0-3.0  Hypercoagulable State                                    2.0.2.3  Left Ventricular Asist Device                            2.0-3.0  Mechanical Heart Valve                                  -    Aortic(with afib, MI, embolism, HF, LA enlargement,    and/or coagulopathy)                                     2.0-3.0 (2.5-3.5)     Mitral                                                             2.5-3.5  Prosthetic/Bioprosthetic Heart Valve               2.0-3.0  Venous thromboembolism (VTE: VT, PE        2.0-3.0    APTT [[id number]]  (Normal) Collected: 04/14/25 1307    Lab Status: Final result Specimen: Blood from Arm, Right Updated: 04/14/25 1328     PTT 29 seconds     Comprehensive metabolic panel [[id number]] Collected: 04/14/25 1204    Lab Status: Final result Specimen: Blood from Arm, Right Updated: 04/14/25 1228     Sodium 140 mmol/L      Potassium 3.8 mmol/L      Chloride 102 mmol/L      CO2 28 mmol/L      ANION GAP 10 mmol/L       BUN 9 mg/dL      Creatinine 0.69 mg/dL      Glucose 104 mg/dL      Calcium 9.4 mg/dL      AST 17 U/L      ALT 27 U/L      Alkaline Phosphatase 66 U/L      Total Protein 7.7 g/dL      Albumin 4.8 g/dL      Total Bilirubin 0.77 mg/dL      eGFR 131 ml/min/1.73sq m     Narrative:      National Kidney Disease Foundation guidelines for Chronic Kidney Disease (CKD):     Stage 1 with normal or high GFR (GFR > 90 mL/min/1.73 square meters)    Stage 2 Mild CKD (GFR = 60-89 mL/min/1.73 square meters)    Stage 3A Moderate CKD (GFR = 45-59 mL/min/1.73 square meters)    Stage 3B Moderate CKD (GFR = 30-44 mL/min/1.73 square meters)    Stage 4 Severe CKD (GFR = 15-29 mL/min/1.73 square meters)    Stage 5 End Stage CKD (GFR <15 mL/min/1.73 square meters)  Note: GFR calculation is accurate only with a steady state creatinine    CBC and differential [[id number]]  (Abnormal) Collected: 04/14/25 1204    Lab Status: Final result Specimen: Blood from Arm, Right Updated: 04/14/25 1212     WBC 13.10 Thousand/uL      RBC 5.11 Million/uL      Hemoglobin 15.3 g/dL      Hematocrit 46.3 %      MCV 91 fL      MCH 29.9 pg      MCHC 33.0 g/dL      RDW 13.2 %      MPV 9.0 fL      Platelets 238 Thousands/uL      nRBC 0 /100 WBCs      Segmented % 80 %      Immature Grans % 1 %      Lymphocytes % 10 %      Monocytes % 9 %      Eosinophils Relative 0 %      Basophils Relative 0 %      Absolute Neutrophils 10.48 Thousands/µL      Absolute Immature Grans 0.08 Thousand/uL      Absolute Lymphocytes 1.31 Thousands/µL      Absolute Monocytes 1.16 Thousand/µL      Eosinophils Absolute 0.02 Thousand/µL      Basophils Absolute 0.05 Thousands/µL             CT facial bones with contrast   Final Interpretation by James Trotter MD (04/14 9589)      Acute facial cellulitis involving right cheek, right retroantral fat, right buccal space, and right perimandibular regions. No soft tissue abscess given limitation of extensive beam-hardening streak artifact  from dental treatment related changes.      Mild reactive cervical lymphadenopathy. Recommend clinical follow-up to resolution.      Sinus disease (moderate in right maxillary sinus).      The study was marked in EPIC for immediate notification.         Workstation performed: [id number]             Procedures    ED Medication and Procedure Management   None     Discharge Medication List as of 4/14/2025  8:27 PM        CONTINUE these medications which have NOT CHANGED    Details   predniSONE 10 mg tablet Take 4 tablets orally for 3 days,  Then 3 tablets for 3 days, then 2 tablets for 3 days, then 1 tablet for 3 days., Normal           No discharge procedures on file.  ED SEPSIS DOCUMENTATION   Time reflects when diagnosis was documented in both MDM as applicable and the Disposition within this note       Time User Action Codes Description Comment    4/14/2025  2:54 PM Sommer Duff [L03.211] Facial cellulitis     4/14/2025  2:55 PM Sommer Duff [K08.89] Pain, dental                  EDINSON Espinoza  04/14/25 3270

## 2025-04-15 VITALS
HEART RATE: 96 BPM | OXYGEN SATURATION: 94 % | SYSTOLIC BLOOD PRESSURE: 142 MMHG | BODY MASS INDEX: 31.99 KG/M2 | HEIGHT: 65 IN | TEMPERATURE: 98.4 F | DIASTOLIC BLOOD PRESSURE: 84 MMHG | RESPIRATION RATE: 16 BRPM | WEIGHT: 192 LBS

## 2025-04-15 LAB
ANION GAP SERPL CALCULATED.3IONS-SCNC: 12 MMOL/L (ref 4–13)
BUN SERPL-MCNC: 13 MG/DL (ref 5–25)
CALCIUM SERPL-MCNC: 8.9 MG/DL (ref 8.4–10.2)
CHLORIDE SERPL-SCNC: 103 MMOL/L (ref 96–108)
CO2 SERPL-SCNC: 25 MMOL/L (ref 21–32)
CREAT SERPL-MCNC: 0.73 MG/DL (ref 0.6–1.3)
ERYTHROCYTE [DISTWIDTH] IN BLOOD BY AUTOMATED COUNT: 13.2 % (ref 11.6–15.1)
GFR SERPL CREATININE-BSD FRML MDRD: 128 ML/MIN/1.73SQ M
GLUCOSE SERPL-MCNC: 80 MG/DL (ref 65–140)
HCT VFR BLD AUTO: 41.5 % (ref 36.5–49.3)
HGB BLD-MCNC: 13.7 G/DL (ref 12–17)
MCH RBC QN AUTO: 29.3 PG (ref 26.8–34.3)
MCHC RBC AUTO-ENTMCNC: 33 G/DL (ref 31.4–37.4)
MCV RBC AUTO: 89 FL (ref 82–98)
PLATELET # BLD AUTO: 203 THOUSANDS/UL (ref 149–390)
PMV BLD AUTO: 8.9 FL (ref 8.9–12.7)
POTASSIUM SERPL-SCNC: 4.1 MMOL/L (ref 3.5–5.3)
RBC # BLD AUTO: 4.67 MILLION/UL (ref 3.88–5.62)
SODIUM SERPL-SCNC: 140 MMOL/L (ref 135–147)
WBC # BLD AUTO: 9.52 THOUSAND/UL (ref 4.31–10.16)

## 2025-04-15 PROCEDURE — 80048 BASIC METABOLIC PNL TOTAL CA: CPT | Performed by: INTERNAL MEDICINE

## 2025-04-15 PROCEDURE — 85027 COMPLETE CBC AUTOMATED: CPT | Performed by: INTERNAL MEDICINE

## 2025-04-15 PROCEDURE — 99239 HOSP IP/OBS DSCHRG MGMT >30: CPT | Performed by: NURSE PRACTITIONER

## 2025-04-15 RX ADMIN — KETOROLAC TROMETHAMINE 15 MG: 30 INJECTION, SOLUTION INTRAMUSCULAR; INTRAVENOUS at 05:06

## 2025-04-15 RX ADMIN — AMPICILLIN SODIUM AND SULBACTAM SODIUM 3 G: 100; 50 INJECTION, POWDER, FOR SOLUTION INTRAVENOUS at 03:56

## 2025-04-15 NOTE — CONSULTS
Patient Name: Hector Mcdonnell YOB: 2000    Medical Record No.: [id number]     Admit/Registration Date: 4/14/2025  8:57 PM  Date of Consult: 04/15/25      Oral and Maxillofacial Surgery Consult Note    Assessment:  24 y.o. male with cellulitis associated with decayed upper right molar. Can be managed outpatient. Cleared for dc from om perspective     Plan/Recs:  Dc to clinic   Npo with drive   F/u at   [address] [place] [id number]     -----------------------------------------    Chief Complaint:  Dental pain    HPI:  24 y.o. male who presents with Upper right dental pain and swelling x 1 week. No trsimus. Denies f/c/n/v      Pre-admission records reviewed. PMH/PSH/Meds/Allergies Reviewed.    History reviewed. No pertinent past medical history.  History reviewed. No pertinent surgical history.    No Known Allergies    Social History     Socioeconomic History    Marital status: Registered Domestic Partner     Spouse name: Not on file    Number of children: Not on file    Years of education: Not on file    Highest education level: Not on file   Occupational History    Not on file   Tobacco Use    Smoking status: Every Day     Current packs/day: 0.50     Average packs/day: 0.5 packs/day for 0.3 years (0.1 ttl pk-yrs)     Types: Cigarettes     Start date: 2025     Passive exposure: Past    Smokeless tobacco: Never   Vaping Use    Vaping status: Never Used   Substance and Sexual Activity    Alcohol use: Never    Drug use: Not Currently     Types: Marijuana     Comment: has not used in last 2 years    Sexual activity: Not on file   Other Topics Concern    Not on file   Social History Narrative    Never a smoker - As per [employer]    Student      Social Drivers of Health     Financial Resource Strain: Low Risk  (1/10/2020)    Overall Financial Resource Strain (CARDIA)     Difficulty of Paying Living Expenses: Not hard at all   Food Insecurity: No Food Insecurity (4/14/2025)    Nursing - Inadequate Food  Risk Classification     Worried About Running Out of Food in the Last Year: Not on file     Ran Out of Food in the Last Year: Not on file     Ran Out of Food in the Last Year: Never true   Transportation Needs: No Transportation Needs (2025)    Nursing - Transportation Risk Classification     Lack of Transportation: Not on file     Lack of Transportation: No   Physical Activity: Inactive (1/10/2020)    Exercise Vital Sign     Days of Exercise per Week: 0 days     Minutes of Exercise per Session: 0 min   Stress: Stress Concern Present (1/10/2020)    [employer] - Occupational Stress Questionnaire     Feeling of Stress : To some extent   Social Connections: Somewhat Isolated (1/10/2020)    Social Connection and Isolation Panel [NHANES]     Frequency of Communication with Friends and Family: More than three times a week     Frequency of Social Gatherings with Friends and Family: Three times a week     Attends Latter-day Services: Never     Active Member of Clubs or Organizations: No     Attends Club or Organization Meetings: Never     Marital Status: Living with partner   Intimate Partner Violence: Unknown (2025)    Nursing IPS     Feels Physically and Emotionally Safe: Not on file     Physically Hurt by Someone: Not on file     Humiliated or Emotionally Abused by Someone: Not on file     Physically Hurt by Someone: No     Hurt or Threatened by Someone: No   Housing Stability: Unknown (2025)    Nursing: Inadequate Housing Risk Classification     Has Housing: Not on file     Worried About Losing Housing: Not on file     Unable to Get Utilities: Not on file     Unable to Pay for Housing in the Last Year: No     Has Housin       Scheduled Medications  Current Facility-Administered Medications   Medication Dose Route Frequency Provider Last Rate    acetaminophen  650 mg Oral Q6H PRN Thang Wise MD      ampicillin-sulbactam  3 g Intravenous Q6H Thang Wise MD Stopped  "(04/15/25 0504)    enoxaparin  40 mg Subcutaneous Daily Thang Wise MD      ketorolac  15 mg Intravenous Q6H JOSE MANUEL Thang Wise MD      ondansetron  4 mg Intravenous Q6H PRN Thang Wise MD      oxyCODONE  10 mg Oral Q4H PRN Thang Wise MD      oxyCODONE  5 mg Oral Q4H PRN Thang Wise MD         PRN Medications    acetaminophen    ondansetron    oxyCODONE    oxyCODONE    Medication Infusions       Review of Systems   HENT:  Positive for dental problem.        Vitals:    Temp:  [98.4 °F (36.9 °C)-99 °F (37.2 °C)] 98.4 °F (36.9 °C)  HR:  [] 96  BP: (121-142)/(65-84) 142/84  Resp:  [16-20] 16  SpO2:  [89 %-98 %] 94 %  O2 Device: None (Room air)  Wt Readings from Last 1 Encounters:   04/14/25 87.1 kg (192 lb)     Ht Readings from Last 1 Encounters:   04/14/25 5' 5\" (1.651 m)     Body mass index is 31.95 kg/m².  Respiratory      Lab Data (Last 4 hours)      None           O2/Vent Data (Last 4 hours)      None                  Patient Lines/Drains/Airways Status       Active Airway       None                  I/O:  Current Diet Order:        Diet Orders   (From admission, onward)                 Start     Ordered    04/15/25 0742  Diet NPO  Diet effective now        References:    Adult Nutrition Support Algorithm    RD Therapeutic Diet Order Protocol   Question Answer Comment   Diet Type NPO    Allow Registered Dietitian to adjust diet order per protocol Yes        04/15/25 0741                     Intake/Output Summary (Last 24 hours) at 4/15/2025 0852  Last data filed at 4/15/2025 0504  Gross per 24 hour   Intake 760 ml   Output --   Net 760 ml       Labs:  Results from last 7 days   Lab Units 04/15/25  0447 04/14/25  1204   WBC Thousand/uL 9.52 13.10*   HEMOGLOBIN g/dL 13.7 15.3   HEMATOCRIT % 41.5 46.3   PLATELETS Thousands/uL 203 238     Results from last 7 days   Lab Units 04/15/25  0447 04/14/25  1204   POTASSIUM mmol/L 4.1 3.8   CHLORIDE mmol/L 103 102   CO2 mmol/L 25 28   BUN mg/dL 13 9 "   CREATININE mg/dL 0.73 0.69   CALCIUM mg/dL 8.9 9.4     Results from last 7 days   Lab Units 04/14/25  1307   INR  0.97   PTT seconds 29         Pain Management Panel           No data to display                  Imaging:   CT: Acute facial cellulitis involving right cheek, right retroantral fat, right buccal space, and right perimandibular regions. No soft tissue abscess given limitation of extensive beam-hardening streak artifact from dental treatment related changes.       Physical Exam:   General: Integment: skin warm and dry, patient is WD/WN, Voice quality: wnl, in NAD  Neuro Exam: AAO x 3, CN V,VII grossly intact, GCS: 15  Head: Normocephalic, no scalp lacerations or hematoma,   Face: No lacerations/Abrasions/Step Offs    Ears: Pinna wnl bilaterally, no otorrhea, hearing grossly intact,    Eyes/Periorbital: Extraocular movements intact, intercanthal distance wnl,    Nose: External nose symmetrical/no gross deformity, no nasal crepitus, no nasal septal hematoma, no rhinorrhea, no epistaxis, bilateral nares patent,    Oral Exam:Lips and mucosal surfaces wnl, floor of mouth is soft with no palpable masses, tongue protrusion is midline and has full range of motion, no pharyngeal edema or exudate   Dentalalveolar Exam:  #1 grossly carious and ttp , BRISEIDA 40, lateral excursive movements wnl,    Lymph/Neck Exam: Neck is soft, trachea is midline, no gross cervical lymphadenopathy bilaterally,    Musculoskeletal: Neck with FROM  Cardiovascular: regular rate and rhythm,    Respiratory: clear to auscultation, no wheezes, rales or rhonchi, symmetric chest rise,    Gastrointestinal: nondistended  Genitourinary: Defer   Extremities: No gross peripheral edema. Negative Ivey/Crane signs,        Yazan Miranda

## 2025-04-15 NOTE — PLAN OF CARE
Problem: PAIN - ADULT  Goal: Verbalizes/displays adequate comfort level or baseline comfort level  Description: Interventions:- Encourage patient to monitor pain and request assistance- Assess pain using appropriate pain scale- Administer analgesics based on type and severity of pain and evaluate response- Implement non-pharmacological measures as appropriate and evaluate response- Consider cultural and social influences on pain and pain management- Notify physician/advanced practitioner if interventions unsuccessful or patient reports new pain  Outcome: Adequate for Discharge     Problem: INFECTION - ADULT  Goal: Absence or prevention of progression during hospitalization  Description: INTERVENTIONS:- Assess and monitor for signs and symptoms of infection- Monitor lab/diagnostic results- Monitor all insertion sites, i.e. indwelling lines, tubes, and drains- Monitor endotracheal if appropriate and nasal secretions for changes in amount and color- [place] appropriate cooling/warming therapies per order- Administer medications as ordered- Instruct and encourage patient and family to use good hand hygiene technique- Identify and instruct in appropriate isolation precautions for identified infection/condition  Outcome: Adequate for Discharge  Goal: Absence of fever/infection during neutropenic period  Description: INTERVENTIONS:- Monitor WBC  Outcome: Adequate for Discharge     Problem: SAFETY ADULT  Goal: Patient will remain free of falls  Description: INTERVENTIONS:- Educate patient/family on patient safety including physical limitations- Instruct patient to call for assistance with activity - Consult OT/PT to assist with strengthening/mobility - Keep Call bell within reach- Keep bed low and locked with side rails adjusted as appropriate- Keep care items and personal belongings within reach- Initiate and maintain comfort rounds- Make Fall Risk Sign visible to staff- Offer Toileting every 2 Hours, in advance of  need- Initiate/Maintain alarm- Obtain necessary fall risk management equipment: - Apply yellow socks and bracelet for high fall risk patients- Consider moving patient to room near nurses station  Outcome: Adequate for Discharge  Goal: Maintain or return to baseline ADL function  Description: INTERVENTIONS:-  Assess patient's ability to carry out ADLs; assess patient's baseline for ADL function and identify physical deficits which impact ability to perform ADLs (bathing, care of mouth/teeth, toileting, grooming, dressing, etc.)- Assess/evaluate cause of self-care deficits - Assess range of motion- Assess patient's mobility; develop plan if impaired- Assess patient's need for assistive devices and provide as appropriate- Encourage maximum independence but intervene and supervise when necessary- Involve family in performance of ADLs- Assess for home care needs following discharge - Consider OT consult to assist with ADL evaluation and planning for discharge- Provide patient education as appropriate  Outcome: Adequate for Discharge  Goal: Maintains/Returns to pre admission functional level  Description: INTERVENTIONS:- Perform AM-PAC 6 Click Basic Mobility/ Daily Activity assessment daily.- Set and communicate daily mobility goal to care team and patient/family/caregiver. - Collaborate with rehabilitation services on mobility goals if consulted- Perform Range of Motion 3 times a day.- Reposition patient every 2 hours.- Dangle patient 3 times a day- Stand patient 3 times a day- Ambulate patient 3 times a day- Out of bed to chair 3 times a day - Out of bed for meals 3 times a day- Out of bed for toileting- Record patient progress and toleration of activity level   Outcome: Adequate for Discharge     Problem: DISCHARGE PLANNING  Goal: Discharge to home or other facility with appropriate resources  Description: INTERVENTIONS:- Identify barriers to discharge w/patient and caregiver- Arrange for needed discharge resources and  transportation as appropriate- Identify discharge learning needs (meds, wound care, etc.)- Arrange for interpretive services to assist at discharge as needed- Refer to Case Management Department for coordinating discharge planning if the patient needs post-hospital services based on physician/advanced practitioner order or complex needs related to functional status, cognitive ability, or social support system  Outcome: Adequate for Discharge     Problem: Knowledge Deficit  Goal: Patient/family/caregiver demonstrates understanding of disease process, treatment plan, medications, and discharge instructions  Description: Complete learning assessment and assess knowledge base.Interventions:- Provide teaching at level of understanding- Provide teaching via preferred learning methods  Outcome: Adequate for Discharge

## 2025-04-15 NOTE — ASSESSMENT & PLAN NOTE
Likely due to dental infection  Transferred to [facility] for an OMS evaluation. He was seen this morning and I have discussed plan with OMS team.  Patient can be managed outpatient.  He will be discharged n.p.o. and head over to the OM office in [place].  Patient and girlfriend were provided addressed.  He received Unasyn in the hospital, I will not prescribe any further antibiotics.  Defer to LMS team postprocedure.

## 2025-04-15 NOTE — UTILIZATION REVIEW
Initial Clinical Review    Admission: Date/Time/Statement:   Admission Orders (From admission, onward)       Ordered        04/14/25 2101  INPATIENT ADMISSION  Once                          Orders Placed This Encounter   Procedures    INPATIENT ADMISSION     Standing Status:   Standing     Number of Occurrences:   1     Level of Care:   Med Surg [16]     Estimated length of stay:   More than 2 Midnights     Certification:   I certify that inpatient services are medically necessary for this patient for a duration of greater than two midnights. See H&P and MD Progress Notes for additional information about the patient's course of treatment.       No chief complaint on file.      Initial Presentation: 24 y.o. male presented to  [facility] Emergency Department,transferred to [facility] as inpatient for facial cellulitis due to dental infection PMH of tobacco use who presents with facial pain. He reports taking ibuprofen without relief. Due to worsening swelling he presented to the ED. There he had a CT scan which showed facial cellulitis without abscess. Due to a lack of OMS services he was transferred to [facility] for further care. He denies any fever or chills. On exam Cervical adenopathy facial edema trismus. Plan consult OMFS, IV Toradol q 6 hrs IV Unasyn monitor temps, wbc, follow up blood and throat cultures       Anticipated Length of Stay/Certification Statement: Patient will be admitted on an inpatient basis with an anticipated length of stay of greater than 2 midnights secondary to possible OR tomorrow     Date: 04-15-25   Day 2: Likely due to dental infection  Transferred to [facility] for an OMS evaluation. He was seen this morning and I have discussed plan with OMS team.  Patient can be managed outpatient.  He will be discharged n.p.o. and head over to the OMFS office in [place].  Patient and girlfriend were provided addressed. He received Unasyn in the hospital, I will not  prescribe any further antibiotics.  Defer to LMS team postprocedure    OMFS consult   Assessment:  24 y.o. male with cellulitis associated with decayed upper right molar. Can be managed outpatient. Cleared for dc from omfs perspective   Plan/Recs:  Dc to clinic   Npo with drive   F/u at         Scheduled Medications:  ampicillin-sulbactam, 3 g, Intravenous, Q6H  enoxaparin, 40 mg, Subcutaneous, Daily  ketorolac, 15 mg, Intravenous, Q6H JOSE MANUEL      Continuous IV Infusions:     PRN Meds:  acetaminophen, 650 mg, Oral, Q6H PRN  ondansetron, 4 mg, Intravenous, Q6H PRN  oxyCODONE, 10 mg, Oral, Q4H PRN  oxyCODONE, 5 mg, Oral, Q4H PRN      ED Triage Vitals   Temperature Pulse Respirations Blood Pressure SpO2 Pain Score   04/14/25 2110 04/14/25 2114 04/14/25 2110 04/14/25 2110 04/14/25 2329 04/14/25 2110   98.4 °F (36.9 °C) 67 20 127/65 (!) 89 % 7     Weight (last 2 days)       Date/Time Weight    04/14/25 2329 87.1 (192)    04/14/25 2109 87.1 (192)            Vital Signs (last 3 days)       Date/Time Temp Pulse Resp BP MAP (mmHg) SpO2 O2 Device Patricia Coma Scale Score Pain    04/15/25 07:26:50 98.4 °F (36.9 °C) 96 16 142/84 103 94 % None (Room air) -- No Pain    04/15/25 0506 -- -- -- -- -- -- -- -- 6    04/15/25 0305 -- 85 -- -- -- 94 % None (Room air) -- --    04/14/25 2329 -- -- -- -- -- 89 % -- -- 5    04/14/25 2135 -- -- -- -- -- -- -- -- 8    04/14/25 21:14:42 98.4 °F (36.9 °C) 67 16 127/65 86 -- -- -- --    04/14/25 21:10:51 98.4 °F (36.9 °C) -- 20 127/65 86 -- -- -- --    04/14/25 2110 -- -- -- -- -- -- -- -- 7    04/14/25 2058 -- -- -- -- -- -- None (Room air) 15 --              Pertinent Labs/Diagnostic Test Results:   Radiology:  No orders to display     Cardiology:  No orders to display     GI:  No orders to display           Results from last 7 days   Lab Units 04/15/25  0447 04/14/25  1204   WBC Thousand/uL 9.52 13.10*   HEMOGLOBIN g/dL 13.7 15.3   HEMATOCRIT % 41.5 46.3   PLATELETS Thousands/uL 203 238   TOTAL  "NEUT ABS Thousands/µL  --  10.48*         Results from last 7 days   Lab Units 04/15/25  0447 04/14/25  1204   SODIUM mmol/L 140 140   POTASSIUM mmol/L 4.1 3.8   CHLORIDE mmol/L 103 102   CO2 mmol/L 25 28   ANION GAP mmol/L 12 10   BUN mg/dL 13 9   CREATININE mg/dL 0.73 0.69   EGFR ml/min/1.73sq m 128 131   CALCIUM mg/dL 8.9 9.4     Results from last 7 days   Lab Units 04/14/25  1204   AST U/L 17   ALT U/L 27   ALK PHOS U/L 66   TOTAL PROTEIN g/dL 7.7   ALBUMIN g/dL 4.8   TOTAL BILIRUBIN mg/dL 0.77         Results from last 7 days   Lab Units 04/15/25  0447 04/14/25  1204   GLUCOSE RANDOM mg/dL 80 104             No results found for: \"BETA-HYDROXYBUTYRATE\"                           Results from last 7 days   Lab Units 04/14/25  1307   PROTIME seconds 13.3   INR  0.97   PTT seconds 29         Results from last 7 days   Lab Units 04/14/25  1307   PROCALCITONIN ng/ml <0.05     Results from last 7 days   Lab Units 04/14/25  1307   LACTIC ACID mmol/L 1.2                                                                                 Results from last 7 days   Lab Units 04/14/25  1307   BLOOD CULTURE  Received in Microbiology Lab. Culture in Progress.  Received in Microbiology Lab. Culture in Progress.                   History reviewed. No pertinent past medical history.  Present on Admission:  **None**      Admitting Diagnosis: Facial cellulitis [L03.211]  Age/Sex: 24 y.o. male    Network Utilization Review Department  ATTENTION: Please call with any questions or concerns to [phone] and carefully listen to the prompts so that you are directed to the right person. All voicemails are confidential.   For Discharge needs, contact Care Management DC Support Team at [phone] opt. 2  Send all requests for admission clinical reviews, approved or denied determinations and any other requests to dedicated fax number below belonging to the campus where the patient is receiving treatment. List of dedicated fax numbers for " the Facilities:  FACILITY NAME UR FAX NUMBER   ADMISSION DENIALS (Administrative/Medical Necessity) [phone]   DISCHARGE SUPPORT TEAM (NETWORK) [phone]   PARENT CHILD HEALTH (Maternity/NICU/Pediatrics) [phone]   [facility] [phone]   [facility] [phone]   [facility] [phone]   [facility] [phone]   [facility] [phone]   [facility] [phone]   [facility] [phone]   [facility] [phone]   [facility] [phone]   [facility] [phone]   [facility] [phone]   [facility] [phone]

## 2025-04-15 NOTE — PLAN OF CARE
Problem: PAIN - ADULT  Goal: Verbalizes/displays adequate comfort level or baseline comfort level  Description: Interventions:- Encourage patient to monitor pain and request assistance- Assess pain using appropriate pain scale- Administer analgesics based on type and severity of pain and evaluate response- Implement non-pharmacological measures as appropriate and evaluate response- Consider cultural and social influences on pain and pain management- Notify physician/advanced practitioner if interventions unsuccessful or patient reports new pain  Outcome: Progressing     Problem: INFECTION - ADULT  Goal: Absence or prevention of progression during hospitalization  Description: INTERVENTIONS:- Assess and monitor for signs and symptoms of infection- Monitor lab/diagnostic results- Monitor all insertion sites, i.e. indwelling lines, tubes, and drains- Monitor endotracheal if appropriate and nasal secretions for changes in amount and color- [place] appropriate cooling/warming therapies per order- Administer medications as ordered- Instruct and encourage patient and family to use good hand hygiene technique- Identify and instruct in appropriate isolation precautions for identified infection/condition  Outcome: Progressing  Goal: Absence of fever/infection during neutropenic period  Description: INTERVENTIONS:- Monitor WBC  Outcome: Progressing     Problem: DISCHARGE PLANNING  Goal: Discharge to home or other facility with appropriate resources  Description: INTERVENTIONS:- Identify barriers to discharge w/patient and caregiver- Arrange for needed discharge resources and transportation as appropriate- Identify discharge learning needs (meds, wound care, etc.)- Arrange for interpretive services to assist at discharge as needed- Refer to Case Management Department for coordinating discharge planning if the patient needs post-hospital services based on physician/advanced practitioner order or complex needs related to  functional status, cognitive ability, or social support system  Outcome: Progressing     Problem: Knowledge Deficit  Goal: Patient/family/caregiver demonstrates understanding of disease process, treatment plan, medications, and discharge instructions  Description: Complete learning assessment and assess knowledge base.Interventions:- Provide teaching at level of understanding- Provide teaching via preferred learning methods  Outcome: Progressing

## 2025-04-15 NOTE — DISCHARGE SUMMARY
Discharge Summary - Hospitalist   Name: Hector Mcdonnell 24 y.o. male I MRN: [id number]  Unit/Bed#: -01 I Date of Admission: 4/14/2025   Date of Service: 4/15/2025 I Hospital Day: 1     Assessment & Plan  Facial cellulitis  Likely due to dental infection  Transferred to [facility] for an OMS evaluation. He was seen this morning and I have discussed plan with OMS team.  Patient can be managed outpatient.  He will be discharged n.p.o. and head over to the OMFS office in [place].  Patient and girlfriend were provided addressed.  He received Unasyn in the hospital, I will not prescribe any further antibiotics.  Defer to LMS team postprocedure.    Tobacco user  Declines a nicotine patch while hospitalized     Medical Problems       Resolved Problems  Date Reviewed: 7/19/2023   None       Discharging Physician / Practitioner: EDINSON Andres  PCP: Irma Dodge DO  Admission Date:   Admission Orders (From admission, onward)       Ordered        04/14/25 2101  INPATIENT ADMISSION  Once                          Discharge Date: 04/15/25    Consultations During Hospital Stay:  Oms     Procedures Performed:   none    Significant Findings / Test Results:   CT Facial bones Acute facial cellulitis involving right cheek, right retroantral fat, right buccal space, and right perimandibular regions. No soft tissue abscess given limitation of extensive beam-hardening streak artifact from dental treatment related changes. Mild reactive cervical lymphadenopathy. Recommend clinical follow-up to resolution. Sinus disease (moderate in right maxillary sinus).   BC pending  Throat culture pending    Incidental Findings:   none       Test Results Pending at Discharge (will require follow up):   BC  Throat culture     Outpatient Tests Requested:  F/u in OMS office later today at 1100  F/u with PCP    Complications:  none    Reason for Admission: dental infection    Hospital Course:   Hector Mcdonnell is a 24 y.o. male patient who originally  "presented to the hospital on 4/14/2025 due to right-sided facial swelling and pain.  CT scan showed facial cellulitis without abscess.  He was transferred to [place] for OMFS evaluation.  He was seen by OMS earlier this morning.  I spoke with them via epic chat, patient will require dental extraction which can be managed as outpatient.  They plan to do this in the office today at 11 AM.  Patient will require adult , his girlfriend is at the bedside and is agreeable to drive him to the office and transport him home following the procedure.  Patient and girlfriend were provided office address.  They are in agreement with plan.    Patient will be discharged n.p.o. in preparation for OMS procedure in the office later this morning.      Please see above list of diagnoses and related plan for additional information.     Condition at Discharge: stable    Discharge Day Visit / Exam:   Subjective:  Continues to have right sided facial pain and swelling. No fevers/chills.  Discussed LMS plan, he is agreeable to be discharged to the office today for dental extraction.  Patient is aware that he needs to remain n.p.o.  Vitals: Blood Pressure: 142/84 (04/15/25 0726)  Pulse: 96 (04/15/25 0726)  Temperature: 98.4 °F (36.9 °C) (04/15/25 0726)  Temp Source: Oral (04/14/25 2114)  Respirations: 16 (04/15/25 0726)  Height: 5' 5\" (165.1 cm) (04/14/25 2329)  Weight - Scale: 87.1 kg (192 lb) (04/14/25 2329)  SpO2: 94 % (04/15/25 0726)  Physical Exam  Vitals and nursing note reviewed.   Constitutional:       General: He is not in acute distress.  HENT:      Head:      Comments: Right sided facial swelling  Cardiovascular:      Rate and Rhythm: Normal rate.   Pulmonary:      Effort: No respiratory distress.   Musculoskeletal:         General: No swelling.   Skin:     General: Skin is warm.   Neurological:      Mental Status: He is alert and oriented to person, place, and time. Mental status is at baseline.   Psychiatric:         Mood " and Affect: Mood normal.          Discussion with Family: Updated  (significant other) at bedside.    Discharge instructions/Information to patient and family:   See after visit summary for information provided to patient and family.      Provisions for Follow-Up Care:  See after visit summary for information related to follow-up care and any pertinent home health orders.      Mobility at time of Discharge:   Basic Mobility Inpatient Raw Score: 24  JH-HLM Goal: 8: Walk 250 feet or more  JH-HLM Achieved: 7: Walk 25 feet or more  HLM Goal NOT achieved. Continue to encourage mobility in post discharge setting.     Disposition:   Home    Planned Readmission: no    Discharge Medications:  See after visit summary for reconciled discharge medications provided to patient and/or family.      Administrative Statements   Discharge Statement:  I have spent a total time of 45 minutes in caring for this patient on the day of the visit/encounter..    **Please Note: This note may have been constructed using a voice recognition system**

## 2025-04-15 NOTE — UTILIZATION REVIEW
NOTIFICATION OF ADMISSION DISCHARGE   This is a Notification of Discharge from [facility]. Please be advised that this patient has been discharge from our facility. Below you will find the admission and discharge date and time including the patient’s disposition.   UTILIZATION REVIEW CONTACT:  Utilization Review Assistants  Network Utilization Review Department  Phone: [phone] x carefully listen to the prompts. All voicemails are confidential.  Email: [email or web address]     ADMISSION INFORMATION  PRESENTATION DATE: 4/14/2025  8:57 PM  OBERVATION ADMISSION DATE: N/A  INPATIENT ADMISSION DATE: 4/14/25  8:57 PM   DISCHARGE DATE: 4/15/2025  9:09 AM   DISPOSITION:Home/Self Care    Network Utilization Review Department  ATTENTION: Please call with any questions or concerns to [phone] and carefully listen to the prompts so that you are directed to the right person. All voicemails are confidential.   For Discharge needs, contact Care Management DC Support Team at [phone] opt. 2  Send all requests for admission clinical reviews, approved or denied determinations and any other requests to dedicated fax number below belonging to the campus where the patient is receiving treatment. List of dedicated fax numbers for the Facilities:  FACILITY NAME UR FAX NUMBER   ADMISSION DENIALS (Administrative/Medical Necessity) [phone]   DISCHARGE SUPPORT TEAM ([facility]) [phone]   PARENT CHILD HEALTH (Maternity/NICU/Pediatrics) [phone]   [facility] [phone]   [facility] [phone]   [facility] [phone]   [facility] [phone]   [facility] [phone]   [facility] [phone]   [facility] [phone]   [facility] [phone]   [facility]  [facility] [phone]   [facility] [phone]   [facility] [phone]   [facility] [phone]

## 2025-04-15 NOTE — CASE MANAGEMENT
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     Exercise-induced asthma 2016     Allergic rhinitis 2013       LOS (days): 1  Geometric Mean LOS (GMLOS) (days):   Days to GMLOS:     OBJECTIVE:  Risk of Unplanned Readmission Score: 4.73         Current admission status: Inpatient   Preferred Pharmacy:   [facility] [address] [place], [place] [address]  Phone: [phone] Fax: [phone]    [facility]/pharmacy #[id number] [place], [place] - [address] [place] [id number]  Phone: [phone] Fax: [phone]    Primary Care Provider: Irma Dodge DO    Primary Insurance: [place] [employer]  Secondary Insurance:     DISCHARGE DETAILS:           Per chart review: Pt transferred from  for OMS consult d/t facial cellulitis. DC order noted. Per DC [place] plan to be managed OP.  Pt discharged NPO - going to OMFS office in [place].

## 2025-04-18 LAB — BACTERIA THROAT CULT: ABNORMAL

## 2025-04-19 LAB
BACTERIA BLD CULT: NORMAL
BACTERIA BLD CULT: NORMAL

## 2025-08-19 ENCOUNTER — APPOINTMENT (EMERGENCY)
Dept: RADIOLOGY | Facility: HOSPITAL | Age: 25
End: 2025-08-19
Payer: COMMERCIAL

## 2025-08-19 ENCOUNTER — HOSPITAL ENCOUNTER (EMERGENCY)
Facility: HOSPITAL | Age: 25
Discharge: HOME | End: 2025-08-19
Attending: EMERGENCY MEDICINE | Admitting: EMERGENCY MEDICINE
Payer: COMMERCIAL

## 2025-08-19 VITALS
HEIGHT: 65 IN | HEART RATE: 79 BPM | WEIGHT: 191 LBS | RESPIRATION RATE: 18 BRPM | SYSTOLIC BLOOD PRESSURE: 131 MMHG | TEMPERATURE: 98.4 F | DIASTOLIC BLOOD PRESSURE: 63 MMHG | OXYGEN SATURATION: 98 % | BODY MASS INDEX: 31.82 KG/M2

## 2025-08-19 DIAGNOSIS — S61.419A HAND LACERATION: Primary | ICD-10-CM

## 2025-08-19 PROCEDURE — 73130 X-RAY EXAM OF HAND: CPT

## 2025-08-19 PROCEDURE — 99284 EMERGENCY DEPT VISIT MOD MDM: CPT | Performed by: EMERGENCY MEDICINE

## 2025-08-19 PROCEDURE — 12001 RPR S/N/AX/GEN/TRNK 2.5CM/<: CPT | Performed by: EMERGENCY MEDICINE

## 2025-08-19 PROCEDURE — 99283 EMERGENCY DEPT VISIT LOW MDM: CPT

## 2025-08-19 RX ORDER — LIDOCAINE HYDROCHLORIDE 10 MG/ML
5 INJECTION, SOLUTION EPIDURAL; INFILTRATION; INTRACAUDAL; PERINEURAL ONCE
Status: COMPLETED | OUTPATIENT
Start: 2025-08-19 | End: 2025-08-19

## 2025-08-19 RX ADMIN — LIDOCAINE HYDROCHLORIDE 5 ML: 10 INJECTION, SOLUTION EPIDURAL; INFILTRATION; INTRACAUDAL; PERINEURAL at 22:38
